# Patient Record
Sex: FEMALE | Race: WHITE | HISPANIC OR LATINO | URBAN - METROPOLITAN AREA
[De-identification: names, ages, dates, MRNs, and addresses within clinical notes are randomized per-mention and may not be internally consistent; named-entity substitution may affect disease eponyms.]

---

## 2017-08-15 PROBLEM — Z00.00 ENCOUNTER FOR PREVENTIVE HEALTH EXAMINATION: Status: ACTIVE | Noted: 2017-08-15

## 2017-10-17 ENCOUNTER — EMERGENCY (EMERGENCY)
Facility: HOSPITAL | Age: 72
LOS: 1 days | Discharge: HOME | End: 2017-10-17
Attending: EMERGENCY MEDICINE
Payer: MEDICARE

## 2017-10-17 VITALS
HEIGHT: 60 IN | SYSTOLIC BLOOD PRESSURE: 130 MMHG | WEIGHT: 225.09 LBS | OXYGEN SATURATION: 98 % | DIASTOLIC BLOOD PRESSURE: 80 MMHG | TEMPERATURE: 98 F | HEART RATE: 80 BPM | RESPIRATION RATE: 20 BRPM

## 2017-10-17 PROCEDURE — 73700 CT LOWER EXTREMITY W/O DYE: CPT

## 2017-10-17 PROCEDURE — 73700 CT LOWER EXTREMITY W/O DYE: CPT | Mod: 26,RT

## 2017-10-17 PROCEDURE — 93971 EXTREMITY STUDY: CPT

## 2017-10-17 PROCEDURE — 93971 EXTREMITY STUDY: CPT | Mod: 26,RT

## 2017-10-17 PROCEDURE — 96372 THER/PROPH/DIAG INJ SC/IM: CPT

## 2017-10-17 PROCEDURE — 99284 EMERGENCY DEPT VISIT MOD MDM: CPT

## 2017-10-17 PROCEDURE — 99284 EMERGENCY DEPT VISIT MOD MDM: CPT | Mod: 25

## 2017-10-17 RX ORDER — KETOROLAC TROMETHAMINE 30 MG/ML
60 SYRINGE (ML) INJECTION ONCE
Qty: 0 | Refills: 0 | Status: DISCONTINUED | OUTPATIENT
Start: 2017-10-17 | End: 2017-10-17

## 2017-10-17 RX ORDER — CYCLOBENZAPRINE HYDROCHLORIDE 10 MG/1
10 TABLET, FILM COATED ORAL ONCE
Qty: 0 | Refills: 0 | Status: COMPLETED | OUTPATIENT
Start: 2017-10-17 | End: 2017-10-17

## 2017-10-17 RX ADMIN — Medication 60 MILLIGRAM(S): at 12:08

## 2017-10-17 RX ADMIN — Medication 60 MILLIGRAM(S): at 13:22

## 2017-10-17 RX ADMIN — CYCLOBENZAPRINE HYDROCHLORIDE 10 MILLIGRAM(S): 10 TABLET, FILM COATED ORAL at 12:08

## 2017-10-17 NOTE — ED ADULT NURSE NOTE - OBJECTIVE STATEMENT
pt arrived wit right  knee swelling x 3 weeks pt with pain had a xray done at urgent care and came to [place] to have xray done, pt states pain 5/10 denies numbness, pt denies tingling

## 2017-10-17 NOTE — ED STATDOCS - OBJECTIVE STATEMENT
73 y/o F presents to ED c/o R knee pain xfew weeks. Pt states her sx began as a burning sensation to the R knee and has gradually worsened to pain. She notes that she fell last week which may have exacerbated the pain. Pt reports visiting the urgent care last week where she had an XR performed (unremarkable) and was dx with arthritis. She states she has been having difficulty ambulating and completely daily tasks. Pt reports taking Aleve (last dose yesterday) and Tumeric to no relief. Denies anti-coagulant use, bleeding problems, ankle pain, numbness, tingling, weakness or any other complaints at this time. NKDA.

## 2017-10-17 NOTE — ED STATDOCS - PROGRESS NOTE DETAILS
patient re-evaluated c/o left knee pain x 3 weeks, she reports h/o arthritis, and is requesting MRI.  Patient had US, pending CT.  Will re-eval.  Advised to rest, ice, elevate, and will RX Walker US reviewed, pending CT results

## 2017-10-17 NOTE — ED STATDOCS - MEDICAL DECISION MAKING DETAILS
Persistent knee pain with inability to walk? XR negative at [facility] x7 days ago. CT to eval for a cult fx, US for DVT. Treat symptomatically, check and re-assess.

## 2017-10-17 NOTE — ED ADULT TRIAGE NOTE - CHIEF COMPLAINT QUOTE
rt knee pain and swelling x 3 weeks, saw an urgent care and they did x-ray. pt arrives here asking for MRI.

## 2017-10-17 NOTE — ED STATDOCS - ATTENDING CONTRIBUTION TO CARE
I, Terrance Robles, performed the initial face to face bedside interview with this patient regarding history of present illness, review of symptoms and relevant past medical, social and family history.  I completed an independent physical examination.  I was the initial provider who evaluated this patient. I have signed out the follow up of any pending tests (i.e. labs, radiological studies) to the ACP.  I have communicated the patient’s plan of care and disposition with the ACP.

## 2017-10-23 ENCOUNTER — APPOINTMENT (OUTPATIENT)
Dept: ORTHOPEDIC SURGERY | Facility: CLINIC | Age: 72
End: 2017-10-23
Payer: MEDICARE

## 2017-10-23 ENCOUNTER — APPOINTMENT (OUTPATIENT)
Dept: DERMATOLOGY | Facility: CLINIC | Age: 72
End: 2017-10-23
Payer: MEDICARE

## 2017-10-23 VITALS
DIASTOLIC BLOOD PRESSURE: 79 MMHG | WEIGHT: 225 LBS | BODY MASS INDEX: 44.17 KG/M2 | SYSTOLIC BLOOD PRESSURE: 146 MMHG | HEIGHT: 60 IN

## 2017-10-23 DIAGNOSIS — Z82.49 FAMILY HISTORY OF ISCHEMIC HEART DISEASE AND OTHER DISEASES OF THE CIRCULATORY SYSTEM: ICD-10-CM

## 2017-10-23 DIAGNOSIS — Z78.9 OTHER SPECIFIED HEALTH STATUS: ICD-10-CM

## 2017-10-23 DIAGNOSIS — Z87.891 PERSONAL HISTORY OF NICOTINE DEPENDENCE: ICD-10-CM

## 2017-10-23 DIAGNOSIS — M25.561 PAIN IN RIGHT KNEE: ICD-10-CM

## 2017-10-23 DIAGNOSIS — Z87.39 PERSONAL HISTORY OF OTHER DISEASES OF THE MUSCULOSKELETAL SYSTEM AND CONNECTIVE TISSUE: ICD-10-CM

## 2017-10-23 DIAGNOSIS — Z56.0 UNEMPLOYMENT, UNSPECIFIED: ICD-10-CM

## 2017-10-23 DIAGNOSIS — M17.11 UNILATERAL PRIMARY OSTEOARTHRITIS, RIGHT KNEE: ICD-10-CM

## 2017-10-23 PROCEDURE — 99203 OFFICE O/P NEW LOW 30 MIN: CPT

## 2017-10-23 PROCEDURE — 99202 OFFICE O/P NEW SF 15 MIN: CPT

## 2017-10-23 RX ORDER — POLYMYXIN B SULFATE AND TRIMETHOPRIM 10000; 1 [USP'U]/ML; MG/ML
10000-0.1 SOLUTION OPHTHALMIC
Qty: 10 | Refills: 0 | Status: ACTIVE | COMMUNITY
Start: 2017-07-14

## 2017-10-23 SDOH — ECONOMIC STABILITY - INCOME SECURITY: UNEMPLOYMENT, UNSPECIFIED: Z56.0

## 2021-04-18 ENCOUNTER — EMERGENCY (EMERGENCY)
Facility: HOSPITAL | Age: 76
LOS: 1 days | Discharge: HOME | End: 2021-04-18
Attending: EMERGENCY MEDICINE
Payer: MEDICARE

## 2021-04-18 VITALS
SYSTOLIC BLOOD PRESSURE: 129 MMHG | HEART RATE: 78 BPM | TEMPERATURE: 98 F | RESPIRATION RATE: 17 BRPM | DIASTOLIC BLOOD PRESSURE: 81 MMHG | HEIGHT: 60 IN | OXYGEN SATURATION: 96 %

## 2021-04-18 PROCEDURE — 99284 EMERGENCY DEPT VISIT MOD MDM: CPT

## 2021-04-18 PROCEDURE — 99284 EMERGENCY DEPT VISIT MOD MDM: CPT | Mod: CS

## 2021-04-18 NOTE — ED ADULT TRIAGE NOTE - CHIEF COMPLAINT QUOTE
PT presents to ED s/p mechanical fall. States she missed the last step. Pain to b/l ankles. Swelling noted no deformity able to move feet and wiggle toes. Denies hitting head Denies LOC denies blood thinners

## 2021-04-19 VITALS
DIASTOLIC BLOOD PRESSURE: 80 MMHG | HEART RATE: 72 BPM | OXYGEN SATURATION: 98 % | RESPIRATION RATE: 18 BRPM | SYSTOLIC BLOOD PRESSURE: 128 MMHG

## 2021-04-19 PROBLEM — M19.90 UNSPECIFIED OSTEOARTHRITIS, UNSPECIFIED SITE: Chronic | Status: ACTIVE | Noted: 2017-10-17

## 2021-04-19 LAB
ANION GAP SERPL CALC-SCNC: 10 MMOL/L — SIGNIFICANT CHANGE UP (ref 5–17)
APTT BLD: 30.8 SEC — SIGNIFICANT CHANGE UP (ref 27.5–35.5)
BLD GP AB SCN SERPL QL: SIGNIFICANT CHANGE UP
BUN SERPL-MCNC: 20 MG/DL — SIGNIFICANT CHANGE UP (ref 8–20)
CALCIUM SERPL-MCNC: 9.4 MG/DL — SIGNIFICANT CHANGE UP (ref 8.6–10.2)
CHLORIDE SERPL-SCNC: 102 MMOL/L — SIGNIFICANT CHANGE UP (ref 98–107)
CO2 SERPL-SCNC: 26 MMOL/L — SIGNIFICANT CHANGE UP (ref 22–29)
CREAT SERPL-MCNC: 0.79 MG/DL — SIGNIFICANT CHANGE UP (ref 0.5–1.3)
GLUCOSE SERPL-MCNC: 116 MG/DL — HIGH (ref 70–99)
HCT VFR BLD CALC: 42 % — SIGNIFICANT CHANGE UP (ref 34.5–45)
HGB BLD-MCNC: 13.9 G/DL — SIGNIFICANT CHANGE UP (ref 11.5–15.5)
INR BLD: 1.13 RATIO — SIGNIFICANT CHANGE UP (ref 0.88–1.16)
MCHC RBC-ENTMCNC: 30.2 PG — SIGNIFICANT CHANGE UP (ref 27–34)
MCHC RBC-ENTMCNC: 33.1 GM/DL — SIGNIFICANT CHANGE UP (ref 32–36)
MCV RBC AUTO: 91.3 FL — SIGNIFICANT CHANGE UP (ref 80–100)
PLATELET # BLD AUTO: 265 K/UL — SIGNIFICANT CHANGE UP (ref 150–400)
POTASSIUM SERPL-MCNC: 4.2 MMOL/L — SIGNIFICANT CHANGE UP (ref 3.5–5.3)
POTASSIUM SERPL-SCNC: 4.2 MMOL/L — SIGNIFICANT CHANGE UP (ref 3.5–5.3)
PROTHROM AB SERPL-ACNC: 13 SEC — SIGNIFICANT CHANGE UP (ref 10.6–13.6)
RBC # BLD: 4.6 M/UL — SIGNIFICANT CHANGE UP (ref 3.8–5.2)
RBC # FLD: 12.2 % — SIGNIFICANT CHANGE UP (ref 10.3–14.5)
SARS-COV-2 RNA SPEC QL NAA+PROBE: SIGNIFICANT CHANGE UP
SODIUM SERPL-SCNC: 138 MMOL/L — SIGNIFICANT CHANGE UP (ref 135–145)
WBC # BLD: 14.54 K/UL — HIGH (ref 3.8–10.5)
WBC # FLD AUTO: 14.54 K/UL — HIGH (ref 3.8–10.5)

## 2021-04-19 PROCEDURE — 99284 EMERGENCY DEPT VISIT MOD MDM: CPT | Mod: 25

## 2021-04-19 PROCEDURE — U0003: CPT

## 2021-04-19 PROCEDURE — G0378: CPT

## 2021-04-19 PROCEDURE — 73610 X-RAY EXAM OF ANKLE: CPT | Mod: 26,LT

## 2021-04-19 PROCEDURE — 85027 COMPLETE CBC AUTOMATED: CPT

## 2021-04-19 PROCEDURE — 93010 ELECTROCARDIOGRAM REPORT: CPT

## 2021-04-19 PROCEDURE — 86900 BLOOD TYPING SEROLOGIC ABO: CPT

## 2021-04-19 PROCEDURE — 99236 HOSP IP/OBS SAME DATE HI 85: CPT

## 2021-04-19 PROCEDURE — 85610 PROTHROMBIN TIME: CPT

## 2021-04-19 PROCEDURE — 86901 BLOOD TYPING SEROLOGIC RH(D): CPT

## 2021-04-19 PROCEDURE — 93005 ELECTROCARDIOGRAM TRACING: CPT

## 2021-04-19 PROCEDURE — 86850 RBC ANTIBODY SCREEN: CPT

## 2021-04-19 PROCEDURE — 36415 COLL VENOUS BLD VENIPUNCTURE: CPT

## 2021-04-19 PROCEDURE — 73610 X-RAY EXAM OF ANKLE: CPT | Mod: 26,RT,77

## 2021-04-19 PROCEDURE — 80048 BASIC METABOLIC PNL TOTAL CA: CPT

## 2021-04-19 PROCEDURE — 73610 X-RAY EXAM OF ANKLE: CPT

## 2021-04-19 PROCEDURE — 85730 THROMBOPLASTIN TIME PARTIAL: CPT

## 2021-04-19 PROCEDURE — 36000 PLACE NEEDLE IN VEIN: CPT

## 2021-04-19 PROCEDURE — U0005: CPT

## 2021-04-19 RX ORDER — IBUPROFEN 200 MG
1 TABLET ORAL
Qty: 56 | Refills: 0
Start: 2021-04-19 | End: 2021-05-02

## 2021-04-19 RX ORDER — IBUPROFEN 200 MG
600 TABLET ORAL ONCE
Refills: 0 | Status: COMPLETED | OUTPATIENT
Start: 2021-04-19 | End: 2021-04-19

## 2021-04-19 RX ORDER — OXYCODONE HYDROCHLORIDE 5 MG/1
2.5 TABLET ORAL EVERY 6 HOURS
Refills: 0 | Status: DISCONTINUED | OUTPATIENT
Start: 2021-04-19 | End: 2021-04-19

## 2021-04-19 RX ORDER — ACETAMINOPHEN 500 MG
650 TABLET ORAL ONCE
Refills: 0 | Status: COMPLETED | OUTPATIENT
Start: 2021-04-19 | End: 2021-04-19

## 2021-04-19 RX ORDER — OXYCODONE HYDROCHLORIDE 5 MG/1
2.5 TABLET ORAL ONCE
Refills: 0 | Status: DISCONTINUED | OUTPATIENT
Start: 2021-04-19 | End: 2021-04-19

## 2021-04-19 RX ORDER — ACETAMINOPHEN 500 MG
2 TABLET ORAL
Qty: 112 | Refills: 0
Start: 2021-04-19 | End: 2021-05-02

## 2021-04-19 RX ORDER — ACETAMINOPHEN 500 MG
650 TABLET ORAL EVERY 6 HOURS
Refills: 0 | Status: DISCONTINUED | OUTPATIENT
Start: 2021-04-19 | End: 2021-04-23

## 2021-04-19 RX ORDER — OXYCODONE HYDROCHLORIDE 5 MG/1
1 TABLET ORAL
Qty: 4 | Refills: 0
Start: 2021-04-19 | End: 2021-04-22

## 2021-04-19 RX ADMIN — OXYCODONE HYDROCHLORIDE 2.5 MILLIGRAM(S): 5 TABLET ORAL at 01:53

## 2021-04-19 RX ADMIN — Medication 650 MILLIGRAM(S): at 00:25

## 2021-04-19 RX ADMIN — Medication 650 MILLIGRAM(S): at 07:29

## 2021-04-19 RX ADMIN — Medication 600 MILLIGRAM(S): at 00:25

## 2021-04-19 RX ADMIN — Medication 600 MILLIGRAM(S): at 07:29

## 2021-04-19 RX ADMIN — OXYCODONE HYDROCHLORIDE 2.5 MILLIGRAM(S): 5 TABLET ORAL at 07:29

## 2021-04-19 NOTE — ED PROVIDER NOTE - CLINICAL SUMMARY MEDICAL DECISION MAKING FREE TEXT BOX
76 y/o F with PMHx arthritis presents to ED BIBEMS c/o bilateral ankle pain s/p mechanical fall today. Pt was walking down stairs when she missed a step and fell down 1 stair onto grass. C/o pain to both ankles worsened with weight bearing.  -Will give analgesics for pain, check xray and reassess

## 2021-04-19 NOTE — ED CDU PROVIDER INITIAL DAY NOTE - ATTENDING CONTRIBUTION TO CARE
76 yo female with bilateral distal fib fractures pending PT consultation and ortho consultation to determine if she can be safely discharged due to fall risk. I personally saw the patient with the PA, and completed the key components of the history and physical exam. I then discussed the management plan with the PA.

## 2021-04-19 NOTE — CONSULT NOTE ADULT - SUBJECTIVE AND OBJECTIVE BOX
Pt Name: AARON CAREY    MRN: [id number]      Patient is a 75y Female presenting to the emergency department with a chief complaint of Patient of b/l ankle pain s/p fall today. Pt states that she missed the last stair and twisted both of her ankles. Pt denies numbness/tingling and has no other complaints at this time.      REVIEW OF SYSTEMS    General: Alert, responsive, in NAD    Skin/Breast: No rashes, no pruritis, + b/ ankle swelling/ecchymosis   	  Ophthalmologic: No visual changes. No redness.   	  ENMT:	No discharge. No swelling.    Respiratory and Thorax: No difficulty breathing. No cough.  	   Cardiovascular:	No chest pain. No palpitations.    Gastrointestinal:	 No abdominal pain. No diarrhea.     Genitourinary: No dysuria. No bleeding.    Musculoskeletal: SEE HPI.    Neurological: No sensory or motor changes.     Psychiatric: No anxiety or depression.    Hematology/Lymphatics: No swelling.    Endocrine: No Hx of diabetes.    ROS is otherwise negative.    PAST MEDICAL & SURGICAL HISTORY:  PAST MEDICAL & SURGICAL HISTORY:  Arthritis    No significant past surgical history        Allergies: No Known Allergies      Medications:     FAMILY HISTORY:  : non-contributory    Social History: Denies ETOH abuse.    Ambulation: Walking independently [ x ] With Cane [ ] With Walker [ ]  Bedbound [ ]                 Vital Signs Last 24 Hrs  T(C): 36.9 (18 Apr 2021 23:57), Max: 36.9 (18 Apr 2021 23:57)  T(F): 98.5 (18 Apr 2021 23:57), Max: 98.5 (18 Apr 2021 23:57)  HR: 78 (18 Apr 2021 23:57) (78 - 78)  BP: 129/81 (18 Apr 2021 23:57) (129/81 - 129/81)  BP(mean): --  RR: 17 (18 Apr 2021 23:57) (17 - 17)  SpO2: 96% (18 Apr 2021 23:57) (96% - 96%)    Daily Height in cm: 152.4 (18 Apr 2021 23:57)    Daily       PHYSICAL EXAM:      Appearance: Alert, responsive, in no acute distress.    Neurological: Sensation is grossly intact to light touch. 5/5 motor function of all extremities. No focal deficits or weaknesses found.    Skin: no rash on visible skin. Skin is clean, dry and intact. No bleeding. No abrasions. No ulcerations.    Vascular: 2+ distal pulses. Cap refill < 2 sec. No signs of venous insufficiency or stasis. No extremity ulcerations. No cyanosis.    Musculoskeletal:         Left Upper Extremity:  + NROM. Non-tender. No signs of trauma.        Right Upper Extremity:  + NROM. Non-tender. No signs of trauma.        Left Lower Extremity: +mild swelling/ecchymosis of the lateral ankle with TTP of the lateral malleolus, +plantar/dorsiflexion/EHL/FHL intact, SILT, 2+ DP pulse palpated, compartments soft and compressible, no open wounds or active bleeding noted.       Right Lower Extremity: +mild swelling/ecchymosis of the lateral ankle with TTP of the lateral malleolus, +plantar/dorsiflexion/EHL/FHL intact, SILT, 2+ DP pulse palpated, compartments soft and compressible, no open wounds or active bleeding noted.    Imaging Studies: All imaging reviewed with Dr. Coleman.    Procedure: SPLINTING   PROCEDURE NOTE: Splinting    Performed by: Guru Guerrero PA-C     Indication: Right lateral malleolus fx & left distal fibula fx    The LLE/RLE was appropriately positioned. A well padded plaster splint was applied. Distally, the extremity was neurovascular intact following the procedure. The patient tolerated the procedure well.     A/P:  Pt is a  75y Female with  a right nondisplaced lateral malleolus fx & Left nondisplaced distal fibula fx s/p fall today.    PLAN d/w Dr. Coleman:   * No immediate orthopedic surgical intervention necessary at this time  * b/l posterior splints applied  * Pt agrees to stay until the morning to receive b/l CAM boots ( to be ordered with [facility] for the a.m.)  * WBAT of the b/l LEs in CAM boot  * Recommend PT eval in the a.m. ( Patient states that she has no interest in going to rehab and her plan is to go home after receiving CAM boots)  * Follow up with Dr. Coleman in the office within 1 week  * Ortho stable

## 2021-04-19 NOTE — CHART NOTE - NSCHARTNOTEFT_GEN_A_CORE
SOCIAL WORK NOTE:  SW CONSULT ORDERED IN [facility] FOR [facility].  PATIENT WITH BILATERAL FRACTURES. AWAITING CAM BOOTS AND PT EVALUATION.   SPOKE WITH PATIENT WHOM IS INSISTING ON GOING HOME NO MATTER WHAT.  CASE PASSED TO [facility] FOR HOME ASSESSMENT.

## 2021-04-19 NOTE — PROVIDER CONTACT NOTE (OTHER) - ASSESSMENT
PT ordered. chart reviewed and noted. pt received in ED, semi figueroa position in stretcher, agreeable to PT. pt requires assistance for functional mobility due to strength and balance deficits. melchor ankle pain pre/post session: 4/10. pt left as received will continue to follow, NAD, all lines intact,   goals: 3-5x a week for 4 days   gait: 50 feet RW modified I   stairs: 5 steps 1 rail supervision

## 2021-04-19 NOTE — ED PROVIDER NOTE - OBJECTIVE STATEMENT
76 y/o F with PMHx arthritis presents to ED BIBEMS c/o bilateral ankle pain s/p mechanical fall today. Pt was walking down stairs when she missed a step and fell down 1 stair onto grass. C/o pain to both ankles worsened with weight bearing. Denies any other acute complaint, head trauma, LOC, hip pain, vomiting, syncope, dizziness, visual changes, CP. Denies preceding sxs to fall. Denies use of anticoagulants. Took no analgesics prior to arrival. 76 y/o F with PMHx arthritis presents to ED BIBEMS c/o bilateral ankle pain s/p mechanical fall today. Pt was walking down stairs when she missed a step and fell down 1 stair onto grass. C/o pain to both ankles worsened with weight bearing. Pt walked back into house to call ambulance. Denies any other acute complaint, head trauma, LOC, hip pain, vomiting, syncope, dizziness, visual changes, CP. Denies preceding sxs to fall. Denies use of anticoagulants. Took no analgesics prior to arrival.

## 2021-04-19 NOTE — PHYSICAL THERAPY INITIAL EVALUATION ADULT - PERTINENT HX OF CURRENT PROBLEM, REHAB EVAL
s/p fall, s/p right non displaced lateral malleolus fracture, left nondisplaced distal fibular fracture

## 2021-04-19 NOTE — ED CDU PROVIDER DISPOSITION NOTE - PRINCIPAL DIAGNOSIS
Closed fracture of distal end of fibula, unspecified fracture morphology, unspecified laterality, initial encounter

## 2021-04-19 NOTE — ED ADULT NURSE REASSESSMENT NOTE - NS ED NURSE REASSESS COMMENT FT1
Assuming care from previous RN, pt A&O x's 4, resp even and unlabored, color good, pt denies pain at this time and offers no complaints, awaiting PT and SW consult, will continue to monitor for safety and comfort, call bell within reach

## 2021-04-19 NOTE — ED CDU PROVIDER DISPOSITION NOTE - CLINICAL COURSE
Patient is a 75 year old female who presented to ER s/p fall.  patient was placed in obs for PT and orthopedic consult.  patient had cam boots applied by orthotic team.  patient was seen by PT And cleared for discharge home.  patient will be given a walker and discharged

## 2021-04-19 NOTE — ED PROVIDER NOTE - PROGRESS NOTE DETAILS
CHATA [place] NOTE: Pt with both fibula fx, seen by ortho, placed in splint. Will place in observation for PT/ SW in morning. Pt agreeable to plan

## 2021-04-19 NOTE — ED CDU PROVIDER DISPOSITION NOTE - PATIENT PORTAL LINK FT
You can access the FollowMyHealth Patient Portal offered by [facility] by registering at the following website: [email or web address]. By joining [facility]’s FollowMyHealth portal, you will also be able to view your health information using other applications (apps) compatible with our system.

## 2021-04-19 NOTE — ED CDU PROVIDER INITIAL DAY NOTE - MEDICAL DECISION MAKING DETAILS
76 y/o F with PMHx arthritis presents to ED BIBEMS c/o bilateral ankle pain s/p mechanical fall today. Pt with bilateral fibula fractures, seen and splinted by orthopedics. Placed in observation for PT eval, SW and likely CAM boot placement

## 2021-04-19 NOTE — ED CDU PROVIDER DISPOSITION NOTE - NSFOLLOWUPINSTRUCTIONS_ED_ALL_ED_FT
- Please follow up with your Primary Care Doctor in 24-48 hr.  - Seek immediate medical care for any new, worsening or concerning signs or symptoms.   - Take medications as directed, be sure to read all instructions on packaging  - You were given copies of all your test results, please bring to your primary care doctor for review of any abnormal test results  - Follow up with orthopedics, in 1 week    Feel better!

## 2021-04-19 NOTE — PROGRESS NOTE ADULT - SUBJECTIVE AND OBJECTIVE BOX
Cristina was awake and alert as I fit her with B/L Cam walkers ,after removing the ace wraps and posterior splints. She was instructed on donning ,air cell adjustment , and basic walking instructions. Additional socks and contact info were provided.  [facility]  [phone]

## 2021-04-19 NOTE — ED PROVIDER NOTE - ATTENDING CONTRIBUTION TO CARE
76 yo female presents for evaluation s/p trip and fall over last step earlier this evening with continued pain and difficulty walking prompting presentation. I personally saw the patient with the PA, and completed the key components of the history and physical exam. I then discussed the management plan with the PA.

## 2021-04-19 NOTE — ED PROVIDER NOTE - PHYSICAL EXAMINATION
Vital signs noted, see flowsheet.  General: NAD, well appearing and non-toxic.  HEENT: NC/AT. MMM. Conjunctiva and sclera clear b/l.  EOMI. PERRL.  Neck: Soft and supple, full ROM without pain. No midline ttp   Cardiac: RRR. +S1/S2. Peripheral pulses 2+ and symmetric b/l.   Respiratory: Speaking in full sentences, Lungs CTA b/l, no wheezes/rhonchi/rales/stridor.   Abdomen: Soft, NTND. No guarding or rebound tenderness. No abdominal bruising   Back: Spine midline and non-tender. No CVAT.  Skin: Normal color for race, no evidence of laceration/ abrasion, cyanosis or jaundice.   Neuro: Awake, alert and oriented to person/place/time/situation. M  Psych: Normal affect     LLE: FROM hip, knee. Ankle limited ROM secondary to pain. Lateral malleolus tenderness with overlying swelling. Cap refill intact.   RLE: FROM hip, knee. Ankle limited ROM secondary to pain. Lateral malleolus tenderness with overlying swelling. Cap refill intact.

## 2021-04-19 NOTE — ED CDU PROVIDER INITIAL DAY NOTE - OBJECTIVE STATEMENT
76 y/o F with PMHx arthritis presents to ED BIBEMS c/o bilateral ankle pain s/p mechanical fall today. Pt was walking down stairs when she missed a step and fell down 1 stair onto grass. C/o pain to both ankles worsened with weight bearing. Pt walked back into house to call ambulance. Denies any other acute complaint, head trauma, LOC, hip pain, vomiting, syncope, dizziness, visual changes, CP. Denies preceding sxs to fall. Denies use of anticoagulants. Took no analgesics prior to arrival.

## 2021-04-19 NOTE — ED CDU PROVIDER DISPOSITION NOTE - CARE PROVIDER_API CALL
Lilliam Coleman)  Orthopedics  [address] [place] [address], [address]  [place] [address]  Phone: [phone]  Fax: [phone]  Follow Up Time:

## 2021-04-19 NOTE — ED ADULT NURSE NOTE - OBJECTIVE STATEMENT
Assumed patient care at this time, Patient is alert and oriented x3, patient was s/p fall down steps c/o melchor ankle pain. swelling noted to bilateral extremities earlier. patient splinted to melchor lower extremities by ortho. +csm. denies numbness or tingling to any or all extremities at this time. denies head  strike with fall.

## 2021-04-27 ENCOUNTER — APPOINTMENT (OUTPATIENT)
Dept: ORTHOPEDIC SURGERY | Facility: CLINIC | Age: 76
End: 2021-04-27
Payer: COMMERCIAL

## 2021-04-27 VITALS
HEIGHT: 60 IN | SYSTOLIC BLOOD PRESSURE: 159 MMHG | HEART RATE: 68 BPM | DIASTOLIC BLOOD PRESSURE: 81 MMHG | BODY MASS INDEX: 41.23 KG/M2 | WEIGHT: 210 LBS

## 2021-04-27 VITALS — TEMPERATURE: 96.7 F

## 2021-04-27 DIAGNOSIS — S82.892A OTHER FRACTURE OF RIGHT LOWER LEG, INITIAL ENCOUNTER FOR CLOSED FRACTURE: ICD-10-CM

## 2021-04-27 DIAGNOSIS — S82.891A OTHER FRACTURE OF RIGHT LOWER LEG, INITIAL ENCOUNTER FOR CLOSED FRACTURE: ICD-10-CM

## 2021-04-27 DIAGNOSIS — S82.402A UNSPECIFIED FRACTURE OF SHAFT OF RIGHT FIBULA, INITIAL ENCOUNTER FOR CLOSED FRACTURE: ICD-10-CM

## 2021-04-27 DIAGNOSIS — S82.401A UNSPECIFIED FRACTURE OF SHAFT OF RIGHT FIBULA, INITIAL ENCOUNTER FOR CLOSED FRACTURE: ICD-10-CM

## 2021-04-27 PROCEDURE — 27786 TREATMENT OF ANKLE FRACTURE: CPT | Mod: 50

## 2021-04-27 PROCEDURE — 99203 OFFICE O/P NEW LOW 30 MIN: CPT | Mod: 57

## 2021-04-27 PROCEDURE — 99072 ADDL SUPL MATRL&STAF TM PHE: CPT

## 2021-04-27 NOTE — HISTORY OF PRESENT ILLNESS
[de-identified] : Patient is a 75-year-old female who presents today for evaluation of bilateral ankle pain.  She suffered a fall about a week ago.  She was seen in the hospital.  Bilateral distal fibula fractures were diagnosed.  She was placed in bilateral Cam boots.  She comes in today for follow-up.  She is able to ambulate with a walker and her cam boots.  She feels the cam boots are very helpful and she would be unable to walk without them.  The patient states the pain is made worse with activity and relieved with rest.  Aching, 4 out of 10. [Bending] : worsened by bending [Lifting] : worsened by lifting [Weight Bearing] : worsened by weight bearing [Recumbency] : relieved by recumbency [Rest] : relieved by rest

## 2021-04-27 NOTE — DISCUSSION/SUMMARY
[de-identified] : The patient presents today with clinical exam findings and radiographic imaging consistent with bilateral minimally displaced distal fibula fractures. Based on the fracture pattern and the ankle stability, it does not require surgical intervention. The ankle mortise appears quite stable and as such should be able to be treated nonoperatively. We will allow the patient to weight-bear as tolerated in a cam walker boot. The patient should come back in one month for repeat x-rays to evaluate for bony healing. At that time we will likely advance to an ankle brace for rotational support for one additional month. \par \par The patient was given the opportunity to ask questions and all questions were answered to their satisfaction.\par \par David Mensah MD\par Orthopaedic Trauma Surgeon\par [facility]\par [facility] [place]\par Director Orthopaedic Trauma, [facility]\par \par \par \par

## 2021-04-27 NOTE — REASON FOR VISIT
[Initial Visit] : an initial visit for [Formal Caregiver] : formal caregiver [FreeTextEntry2] : Bilateral ankle pain

## 2021-04-27 NOTE — PHYSICAL EXAM
[de-identified] : Physical Exam:\par General: Well appearing, no acute distress, A&O\par Neurologic: A&Ox3, No focal deficits\par Head: NCAT without abrasions, lacerations, or ecchymosis to head, face, or scalp\par Respiratory: Equal chest wall expansion bilaterally, no accessory muscle use\par Lymphatic: No lymphadenopathy palpated\par Skin: Warm and dry\par Psychiatric: Normal mood and affect\par \par BLE: \par SILT s/s/sp/dp/t\par Fires EHL/FHL/GS/TA\par 2+ DP/PT pulse\par brisk capillary refill\par Positive tenderness to palpation bilateral fibula [de-identified] : Plain films of the bilateral ankles were obtained in the emergency department and reviewed today.  There are nondisplaced fractures of the bilateral distal fibula.

## 2021-04-28 ENCOUNTER — FORM ENCOUNTER (OUTPATIENT)
Age: 76
End: 2021-04-28

## 2021-05-25 ENCOUNTER — APPOINTMENT (OUTPATIENT)
Dept: ORTHOPEDIC SURGERY | Facility: CLINIC | Age: 76
End: 2021-05-25

## 2021-06-08 ENCOUNTER — APPOINTMENT (OUTPATIENT)
Dept: ORTHOPEDIC SURGERY | Facility: CLINIC | Age: 76
End: 2021-06-08
Payer: MEDICARE

## 2021-06-08 VITALS
BODY MASS INDEX: 41.23 KG/M2 | SYSTOLIC BLOOD PRESSURE: 159 MMHG | HEART RATE: 76 BPM | WEIGHT: 210 LBS | DIASTOLIC BLOOD PRESSURE: 90 MMHG | HEIGHT: 60 IN

## 2021-06-08 PROCEDURE — 73610 X-RAY EXAM OF ANKLE: CPT | Mod: 50

## 2021-06-08 PROCEDURE — 99024 POSTOP FOLLOW-UP VISIT: CPT

## 2021-06-08 NOTE — DISCUSSION/SUMMARY
[de-identified] : The patient presents today with clinical exam findings and radiographic imaging consistent with bilateral minimally displaced distal fibula fractures. Based on the fracture pattern and the ankle stability, it does not require surgical intervention. The ankle mortise appears quite stable and as such should be able to be treated nonoperatively. We will allow the patient to weight-bear as tolerated in regular shoes at the present time.  She is allowed to start returning to all activities as tolerated.  Physical therapy was offered but she politely declined.\par \par The question of when to drive is impossible to generalize to everyone because it is largely dependent on the individual.  Importantly, doctors do not have a license with the DMV to "clear you" or "release you" to return to driving.  There are 3 primary criteria that must be met.  You need to be off of narcotic pain medicines (otherwise you are driving under the influence).  You need to be able to get in and out of the 's seat comfortably.  And you must have regained your normal reflexes / strength.  Also, return to driving depends partly on what side had surgery (ie. Right leg operates the pedals; people with Left side surgery can generally get back to driving much sooner unless you have a clutch).  The average time to return to driving is around 2 weeks after you return to normal walking for the right side and usually sooner for the left.  We recommend 'testing' yourself with another licensed  in an empty parking lot or quiet street first in order to check your reflexes moving your foot from pedal to pedal.\par \par No further orthopedic trauma intervention is required but we will help facilitate future care as needed. The patient may follow up as needed.\par \par The patient was given the opportunity to ask questions and all questions were answered to their satisfaction.\par \par David Mensah MD\par Orthopaedic Trauma Surgeon\par [facility]\par [facility] [place]\par Director Orthopaedic Trauma, [facility]\par \par \par \par

## 2021-06-08 NOTE — PHYSICAL EXAM
[de-identified] : Physical Exam:\par General: Well appearing, no acute distress, A&O\par Neurologic: A&Ox3, No focal deficits\par Head: NCAT without abrasions, lacerations, or ecchymosis to head, face, or scalp\par Respiratory: Equal chest wall expansion bilaterally, no accessory muscle use\par Lymphatic: No lymphadenopathy palpated\par Skin: Warm and dry\par Psychiatric: Normal mood and affect\par \par BLE: \par SILT s/s/sp/dp/t\par Fires EHL/FHL/GS/TA\par 2+ DP/PT pulse\par brisk capillary refill\par Minimal positive tenderness to palpation bilateral fibula [de-identified] : Plain films of the bilateral ankles were obtained today compared to films of the last visit.  There is continued healing of minimally displaced bilateral distal fibula fractures.

## 2021-06-08 NOTE — HISTORY OF PRESENT ILLNESS
[de-identified] : Patient is a 75-year-old female who presents today for follow-up evaluation of bilateral ankle pain.  She suffered a fall a little over a month ago.  She was seen in the hospital.  Bilateral distal fibula fractures were diagnosed.  She was placed in bilateral Cam boots.  She comes in today for follow-up.  Since her last visit she has been able to discontinue the cam boots and is walking without assistive devices.  The patient states the pain is made worse with activity and relieved with rest.  Aching, 4 out of 10.

## 2021-08-11 ENCOUNTER — APPOINTMENT (OUTPATIENT)
Dept: ORTHOPEDIC SURGERY | Facility: CLINIC | Age: 76
End: 2021-08-11

## 2021-09-27 ENCOUNTER — APPOINTMENT (OUTPATIENT)
Dept: ORTHOPEDIC SURGERY | Facility: CLINIC | Age: 76
End: 2021-09-27
Payer: MEDICARE

## 2021-09-27 VITALS
HEIGHT: 60 IN | HEART RATE: 66 BPM | BODY MASS INDEX: 41.23 KG/M2 | WEIGHT: 210 LBS | SYSTOLIC BLOOD PRESSURE: 151 MMHG | DIASTOLIC BLOOD PRESSURE: 91 MMHG

## 2021-09-27 DIAGNOSIS — M25.572 PAIN IN LEFT ANKLE AND JOINTS OF LEFT FOOT: ICD-10-CM

## 2021-09-27 DIAGNOSIS — M25.521 PAIN IN RIGHT ELBOW: ICD-10-CM

## 2021-09-27 DIAGNOSIS — M25.571 PAIN IN RIGHT ANKLE AND JOINTS OF RIGHT FOOT: ICD-10-CM

## 2021-09-27 PROCEDURE — 99214 OFFICE O/P EST MOD 30 MIN: CPT

## 2021-09-27 PROCEDURE — 73610 X-RAY EXAM OF ANKLE: CPT | Mod: 50

## 2021-09-27 PROCEDURE — 73080 X-RAY EXAM OF ELBOW: CPT | Mod: RT

## 2021-09-27 NOTE — HISTORY OF PRESENT ILLNESS
[de-identified] : Patient is a 76-year-old female who presents today for follow-up evaluation of bilateral ankle pain.  She suffered a fall.  She was seen in the hospital.  Bilateral distal fibula fractures were diagnosed.  She was placed in bilateral Cam boots in the hospital.  She comes in today for follow-up. she is walking without assistive devices.  The patient states the pain is made worse with activity and relieved with rest.  Aching, 3 out of 10. also has c/o elbow pain [Bending] : worsened by bending [Lifting] : worsened by lifting [Weight Bearing] : worsened by weight bearing [Recumbency] : relieved by recumbency [Rest] : relieved by rest

## 2021-09-27 NOTE — DISCUSSION/SUMMARY
[de-identified] : The patient presents today with clinical exam findings and radiographic imaging consistent with bilateral minimally displaced distal fibula fractures. Based on the fracture pattern and the ankle stability, it does not require surgical intervention. The ankle mortise appears quite stable and as such should be able to be treated nonoperatively. We will allow the patient to weight-bear as tolerated in regular shoes at the present time.  She is allowed to start returning to all activities as tolerated.  Physical therapy was offered but she politely declined.\par \par The question of when to drive is impossible to generalize to everyone because it is largely dependent on the individual.  Importantly, doctors do not have a license with the DMV to "clear you" or "release you" to return to driving.  There are 3 primary criteria that must be met.  You need to be off of narcotic pain medicines (otherwise you are driving under the influence).  You need to be able to get in and out of the 's seat comfortably.  And you must have regained your normal reflexes / strength.  Also, return to driving depends partly on what side had surgery (ie. Right leg operates the pedals; people with Left side surgery can generally get back to driving much sooner unless you have a clutch).  The average time to return to driving is around 2 weeks after you return to normal walking for the right side and usually sooner for the left.  We recommend 'testing' yourself with another licensed  in an empty parking lot or quiet street first in order to check your reflexes moving your foot from pedal to pedal.\par \par No further orthopedic trauma intervention is required but we will help facilitate future care as needed. The patient may follow up as needed.\par \par The patient was given the opportunity to ask questions and all questions were answered to their satisfaction.\par \par David Mensah MD\par Orthopaedic Trauma Surgeon\par [facility]\par [facility] [place]\par Director Orthopaedic Trauma, [facility]\par \par \par \par

## 2021-09-27 NOTE — PHYSICAL EXAM
[de-identified] : Physical Exam:\par General: Well appearing, no acute distress, A&O\par Neurologic: A&Ox3, No focal deficits\par Head: NCAT without abrasions, lacerations, or ecchymosis to head, face, or scalp\par Respiratory: Equal chest wall expansion bilaterally, no accessory muscle use\par Lymphatic: No lymphadenopathy palpated\par Skin: Warm and dry\par Psychiatric: Normal mood and affect\par \par BLE: \par SILT s/s/sp/dp/t\par Fires EHL/FHL/GS/TA\par 2+ DP/PT pulse\par brisk capillary refill\par Minimal positive tenderness to palpation bilateral fibula\par \par RUE\par SILT r/m/u\par Fires AIN/PIN/ulnar\par 2+ radial pulse\par brisk capillary refill\par + TTP lateral elbow  [de-identified] : Plain films of the bilateral ankles were obtained today compared to films of the last visit.  There is continued healing of minimally displaced bilateral distal fibula fractures.\par \par Right elbow films are normal

## 2021-10-06 ENCOUNTER — APPOINTMENT (OUTPATIENT)
Dept: ORTHOPEDIC SURGERY | Facility: CLINIC | Age: 76
End: 2021-10-06

## 2022-05-07 ENCOUNTER — INPATIENT (INPATIENT)
Facility: HOSPITAL | Age: 77
LOS: 3 days | Discharge: HOME | DRG: 375 | End: 2022-05-11
Attending: SURGERY | Admitting: OBSTETRICS & GYNECOLOGY
Payer: MEDICARE

## 2022-05-07 VITALS
RESPIRATION RATE: 16 BRPM | DIASTOLIC BLOOD PRESSURE: 82 MMHG | TEMPERATURE: 99 F | WEIGHT: 180.78 LBS | OXYGEN SATURATION: 97 % | HEART RATE: 124 BPM | SYSTOLIC BLOOD PRESSURE: 128 MMHG | HEIGHT: 60 IN

## 2022-05-07 LAB
ALBUMIN SERPL ELPH-MCNC: 4.2 G/DL — SIGNIFICANT CHANGE UP (ref 3.3–5.2)
ALP SERPL-CCNC: 106 U/L — SIGNIFICANT CHANGE UP (ref 40–120)
ALT FLD-CCNC: 14 U/L — SIGNIFICANT CHANGE UP
ANION GAP SERPL CALC-SCNC: 18 MMOL/L — HIGH (ref 5–17)
AST SERPL-CCNC: 19 U/L — SIGNIFICANT CHANGE UP
BASE EXCESS BLDV CALC-SCNC: 1.3 MMOL/L — SIGNIFICANT CHANGE UP (ref -2–3)
BASOPHILS # BLD AUTO: 0.07 K/UL — SIGNIFICANT CHANGE UP (ref 0–0.2)
BASOPHILS NFR BLD AUTO: 0.4 % — SIGNIFICANT CHANGE UP (ref 0–2)
BILIRUB SERPL-MCNC: 0.7 MG/DL — SIGNIFICANT CHANGE UP (ref 0.4–2)
BUN SERPL-MCNC: 17.1 MG/DL — SIGNIFICANT CHANGE UP (ref 8–20)
CALCIUM SERPL-MCNC: 10.3 MG/DL — HIGH (ref 8.6–10.2)
CHLORIDE SERPL-SCNC: 99 MMOL/L — SIGNIFICANT CHANGE UP (ref 98–107)
CO2 SERPL-SCNC: 22 MMOL/L — SIGNIFICANT CHANGE UP (ref 22–29)
CREAT SERPL-MCNC: 1.15 MG/DL — SIGNIFICANT CHANGE UP (ref 0.5–1.3)
EGFR: 50 ML/MIN/1.73M2 — LOW
EOSINOPHIL # BLD AUTO: 0.03 K/UL — SIGNIFICANT CHANGE UP (ref 0–0.5)
EOSINOPHIL NFR BLD AUTO: 0.2 % — SIGNIFICANT CHANGE UP (ref 0–6)
GLUCOSE SERPL-MCNC: 153 MG/DL — HIGH (ref 70–99)
HCO3 BLDV-SCNC: 27 MMOL/L — SIGNIFICANT CHANGE UP (ref 22–29)
HCT VFR BLD CALC: 49.9 % — HIGH (ref 34.5–45)
HGB BLD-MCNC: 16.4 G/DL — HIGH (ref 11.5–15.5)
IMM GRANULOCYTES NFR BLD AUTO: 0.5 % — SIGNIFICANT CHANGE UP (ref 0–1.5)
LIDOCAIN IGE QN: 30 U/L — SIGNIFICANT CHANGE UP (ref 22–51)
LYMPHOCYTES # BLD AUTO: 0.76 K/UL — LOW (ref 1–3.3)
LYMPHOCYTES # BLD AUTO: 3.9 % — LOW (ref 13–44)
MCHC RBC-ENTMCNC: 29.9 PG — SIGNIFICANT CHANGE UP (ref 27–34)
MCHC RBC-ENTMCNC: 32.9 GM/DL — SIGNIFICANT CHANGE UP (ref 32–36)
MCV RBC AUTO: 91.1 FL — SIGNIFICANT CHANGE UP (ref 80–100)
MONOCYTES # BLD AUTO: 0.83 K/UL — SIGNIFICANT CHANGE UP (ref 0–0.9)
MONOCYTES NFR BLD AUTO: 4.3 % — SIGNIFICANT CHANGE UP (ref 2–14)
NEUTROPHILS # BLD AUTO: 17.46 K/UL — HIGH (ref 1.8–7.4)
NEUTROPHILS NFR BLD AUTO: 90.7 % — HIGH (ref 43–77)
PCO2 BLDV: 46 MMHG — HIGH (ref 39–42)
PH BLDV: 7.37 — SIGNIFICANT CHANGE UP (ref 7.32–7.43)
PLATELET # BLD AUTO: 388 K/UL — SIGNIFICANT CHANGE UP (ref 150–400)
PO2 BLDV: 44 MMHG — SIGNIFICANT CHANGE UP (ref 25–45)
POTASSIUM SERPL-MCNC: 4.3 MMOL/L — SIGNIFICANT CHANGE UP (ref 3.5–5.3)
POTASSIUM SERPL-SCNC: 4.3 MMOL/L — SIGNIFICANT CHANGE UP (ref 3.5–5.3)
PROT SERPL-MCNC: 8.3 G/DL — SIGNIFICANT CHANGE UP (ref 6.6–8.7)
RAPID RVP RESULT: SIGNIFICANT CHANGE UP
RBC # BLD: 5.48 M/UL — HIGH (ref 3.8–5.2)
RBC # FLD: 13.1 % — SIGNIFICANT CHANGE UP (ref 10.3–14.5)
SAO2 % BLDV: 76.1 % — SIGNIFICANT CHANGE UP
SARS-COV-2 RNA SPEC QL NAA+PROBE: SIGNIFICANT CHANGE UP
SODIUM SERPL-SCNC: 139 MMOL/L — SIGNIFICANT CHANGE UP (ref 135–145)
TROPONIN T SERPL-MCNC: <0.01 NG/ML — SIGNIFICANT CHANGE UP (ref 0–0.06)
WBC # BLD: 19.25 K/UL — HIGH (ref 3.8–10.5)
WBC # FLD AUTO: 19.25 K/UL — HIGH (ref 3.8–10.5)

## 2022-05-07 PROCEDURE — 99284 EMERGENCY DEPT VISIT MOD MDM: CPT | Mod: FS

## 2022-05-07 PROCEDURE — 71045 X-RAY EXAM CHEST 1 VIEW: CPT | Mod: 26

## 2022-05-07 PROCEDURE — 74176 CT ABD & PELVIS W/O CONTRAST: CPT | Mod: 26,MA

## 2022-05-07 RX ORDER — DIATRIZOATE MEGLUMINE 180 MG/ML
30 INJECTION, SOLUTION INTRAVESICAL ONCE
Refills: 0 | Status: COMPLETED | OUTPATIENT
Start: 2022-05-07 | End: 2022-05-07

## 2022-05-07 RX ORDER — ONDANSETRON 8 MG/1
4 TABLET, FILM COATED ORAL ONCE
Refills: 0 | Status: COMPLETED | OUTPATIENT
Start: 2022-05-07 | End: 2022-05-07

## 2022-05-07 RX ORDER — MORPHINE SULFATE 50 MG/1
2 CAPSULE, EXTENDED RELEASE ORAL ONCE
Refills: 0 | Status: DISCONTINUED | OUTPATIENT
Start: 2022-05-07 | End: 2022-05-07

## 2022-05-07 RX ORDER — IOHEXOL 300 MG/ML
30 INJECTION, SOLUTION INTRAVENOUS ONCE
Refills: 0 | Status: DISCONTINUED | OUTPATIENT
Start: 2022-05-07 | End: 2022-05-07

## 2022-05-07 RX ADMIN — DIATRIZOATE MEGLUMINE 30 MILLILITER(S): 180 INJECTION, SOLUTION INTRAVESICAL at 20:05

## 2022-05-07 RX ADMIN — ONDANSETRON 4 MILLIGRAM(S): 8 TABLET, FILM COATED ORAL at 19:08

## 2022-05-07 RX ADMIN — MORPHINE SULFATE 2 MILLIGRAM(S): 50 CAPSULE, EXTENDED RELEASE ORAL at 19:08

## 2022-05-07 NOTE — ED STATDOCS - OBJECTIVE STATEMENT
75 y/o female with no PMHx, c/o abdominal pain. Pt reports abdominal pain since AM with nausea, vomiting 1x, and SOB. Denies diarrhea. Pt is covid vaccinated. Noticed increase bloating. Denies hx of abdominal surgeries.

## 2022-05-07 NOTE — ED STATDOCS - PROGRESS NOTE DETAILS
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 results d/w pt. pt resting comfortably, NAD at this time. OBGYN resident consulted, will see pt in ED CHATA Walker: plan d/w OBGYN resident, pt to be admitted for further inpt management

## 2022-05-07 NOTE — ED ADULT NURSE REASSESSMENT NOTE - NS ED NURSE REASSESS COMMENT FT1
assumed care of pt at 19:25. report received from viviane cedeno. charting as noted. rr even and unlabored. anox4. pt educated on plan of care, pt able to successfully teach back plan of care to RN, RN will continue to reeducate pt during hospital stay. awaiting ct

## 2022-05-07 NOTE — ED STATDOCS - NS_ ATTENDINGSCRIBEDETAILS _ED_A_ED_FT
I, Freddie Gudino, performed the initial face to face bedside interview with this patient regarding history of present illness, review of symptoms and relevant past medical, social and family history.  I completed an independent physical examination.  I was the initial provider who evaluated this patient. I have signed out the follow up of any pending tests (i.e. labs, radiological studies) to the ACP.  I have communicated the patient’s plan of care and disposition with the ACP.  The history, relevant review of systems, past medical and surgical history, medical decision making, and physical examination was documented by the scribe in my presence and I attest to the accuracy of the documentation.

## 2022-05-08 DIAGNOSIS — R18.8 OTHER ASCITES: ICD-10-CM

## 2022-05-08 LAB
CANCER AG125 SERPL-ACNC: 22 U/ML — SIGNIFICANT CHANGE UP
CANCER AG19-9 SERPL-ACNC: 9 U/ML — SIGNIFICANT CHANGE UP
CEA SERPL-MCNC: 1.2 NG/ML — SIGNIFICANT CHANGE UP (ref 0–3.8)

## 2022-05-08 PROCEDURE — 74177 CT ABD & PELVIS W/CONTRAST: CPT | Mod: 26

## 2022-05-08 PROCEDURE — 99223 1ST HOSP IP/OBS HIGH 75: CPT

## 2022-05-08 RX ORDER — FAMOTIDINE 10 MG/ML
20 INJECTION INTRAVENOUS
Refills: 0 | Status: DISCONTINUED | OUTPATIENT
Start: 2022-05-08 | End: 2022-05-11

## 2022-05-08 RX ORDER — FAMOTIDINE 10 MG/ML
20 INJECTION INTRAVENOUS ONCE
Refills: 0 | Status: COMPLETED | OUTPATIENT
Start: 2022-05-08 | End: 2022-05-08

## 2022-05-08 RX ADMIN — FAMOTIDINE 20 MILLIGRAM(S): 10 INJECTION INTRAVENOUS at 08:27

## 2022-05-08 RX ADMIN — FAMOTIDINE 20 MILLIGRAM(S): 10 INJECTION INTRAVENOUS at 18:00

## 2022-05-08 NOTE — H&P ADULT - NSHPLABSRESULTS_GEN_ALL_CORE
Complete Blood Count + Automated Diff (05.07.22 @ 18:55)    WBC Count: 19.25 K/uL    RBC Count: 5.48 M/uL    Hemoglobin: 16.4 g/dL    Hematocrit: 49.9 %    Mean Cell Volume: 91.1 fl    Mean Cell Hemoglobin: 29.9 pg    Mean Cell Hemoglobin Conc: 32.9 gm/dL    Red Cell Distrib Width: 13.1 %    Platelet Count - Automated: 388 K/uL    Auto Neutrophil #: 17.46 K/uL    Auto Lymphocyte #: 0.76 K/uL    Auto Monocyte #: 0.83 K/uL    Auto Eosinophil #: 0.03 K/uL    Auto Basophil #: 0.07 K/uL    Auto Neutrophil %: 90.7: Differential percentages must be correlated with absolute numbers for  clinical significance. %    Auto Lymphocyte %: 3.9 %    Auto Monocyte %: 4.3 %    Auto Eosinophil %: 0.2 %    Auto Basophil %: 0.4 %    Auto Immature Granulocyte %: 0.5: (Includes meta, myelo and promyelocytes) %      < from: CT Abdomen and Pelvis w/ Oral Cont (05.07.22 @ 22:06) >    INTERPRETATION:  CLINICAL INFORMATION: Abdominal pain    COMPARISON: None.    CONTRAST/COMPLICATIONS:  IV Contrast: NONE  Oral Contrast: Omnipaque 300 + Fruit 2o  Complications: None reported at time of study completion    PROCEDURE:  CT of the Abdomen and Pelvis was performed.  Sagittal and coronal reformats were performed.    FINDINGS:  LOWER CHEST: Coarse left breast calcification. Asymmetric right upper   outer quadrant breast tissue. Oral contrast in the distal esophagus;   recommend aspiration precautions.    LIVER: Scalp margin of the liver compatible with serosal implants as   described below.  BILE DUCTS: Normal caliber.  GALLBLADDER: Within normal limits.  SPLEEN: Within normal limits.  PANCREAS: Within normal limits.  ADRENALS: Within normal limits.  KIDNEYS/URETERS: Within normal limits.    BLADDER: Within normal limits.  REPRODUCTIVE ORGANS: Central endometrial calcification. Bilateral   multicystic and calcified adnexal masses are seen, measuring 8.5 x 8.3 cm   on the right and 11.6 x 10.0 cm on the left.    BOWEL: No bowel obstruction. Appendix is not discretely visualized due to   multiple loculated peritoneal implants in this region. Diverticulosis   coli.  PERITONEUM: Diffuse peritoneal and serosal calcifications with moderate   volume loculated ascites. Innumerable peritoneal calcifications and   loculated peritoneal implants, many of which are partiallycalcified.  VESSELS: Within normal limits.  RETROPERITONEUM/LYMPH NODES: No lymphadenopathy.  ABDOMINAL WALL: Within normal limits.  BONES: Degenerative changes.    IMPRESSION:  Moderate volume loculated ascites with diffuse peritoneal/serosal   calcification, innumerable low-density calcified peritoneal implants, and   bilateral calcified adnexal masses. 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 Differential   however includes synchronous bilateral ovarian mucinous or serous tumors   causing the diffuse peritoneal disease. Central calcification in the   endometrium for which mucinous neoplasm of the endometrium is not   excluded.    Incidental note of asymmetric right upper outer quadrant breast tissue   for which physical exam correlation is recommended and dedicated breast   imaging as clinically warranted.    --- End of Report ---    < end of copied text >

## 2022-05-08 NOTE — H&P ADULT - NSICDXFAMILYHX_GEN_ALL_CORE_FT
FAMILY HISTORY:  Child  Still living? Unknown  Family history of obesity, Age at diagnosis: Age Unknown

## 2022-05-08 NOTE — H&P ADULT - NSHPPHYSICALEXAM_GEN_ALL_CORE
Gen: NAD, AOx3  Lungs: CTABL, no WRR  CV: S1S2, RRR  Abd: soft, nontender, non-distended, no rebounding or guarding, +BS  Ext: no calf tenderness bilaterally, evidence of venous insufficiency

## 2022-05-08 NOTE — H&P ADULT - ASSESSMENT
77yo  LMP age 52 admitted with appendiceal vs. ovarian tumors.     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 Based on current imaging, cancer seems to be at an advanced stage. Patient states she would like everything done. Will send tumor markers. May need biopsy for tissue diagnosis.   Cardiac: No cardiac issues  Pulmonary: no active disease  Neuro: No pain  GI: tolerating regular diet   : voiding spontaneously  ID: Leukocytosis likely related to tumor burden, no signs/symptoms of infection  Heme: SCDs when not ambulating, may consider Lovenox for VTE prophylaxis.   Skin: no active disease   Psych: no active problems   Code Status: full code  Dispo: admission to Gyn/Onc service for work up   77yo  LMP age 52 admitted for workup of appendiceal vs. ovarian primary tumor. ECOG 0.     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 Based on current imaging, cancer seems to be at an advanced stage. Patient states she would like everything done. Will send tumor markers. May need biopsy for tissue diagnosis.   Cardiac: No cardiac issues  Pulmonary: no active disease  Neuro: No pain  GI: tolerating regular diet   : voiding spontaneously  ID: Leukocytosis likely related to tumor burden, no signs/symptoms of infection  Heme: SCDs when not ambulating, may consider Lovenox for VTE prophylaxis.   Skin: no active disease   Psych: no active problems   Code Status: full code  Dispo: admission to Gyn/Onc service for work up

## 2022-05-08 NOTE — ED ADULT NURSE REASSESSMENT NOTE - NS ED NURSE REASSESS COMMENT FT1
Pt is A&O4, pt is being transferred to CDU Bed 12R and report given to Emiliana BONNER RN, pts current condition, medical history and reason for admission reviewed, R AC 20G IV site is clean/dry/intact, Pt is ambulatory without assistance, pt is not in any pain or distress at this time, pt is aware of the transfer, the need for the transfer and acclimated to the new unit, pt VS recorded and placed in the EMR, pt education deemed successful after teach back shows proficiency.

## 2022-05-08 NOTE — H&P ADULT - HISTORY OF PRESENT ILLNESS
75yo  LMP age 52 presenting to the ED complaining of abdominal tightness after she had chinese food yesterday. Patient states she tried to self-induce vomiting because she felt the food was stuck in her epigastric area and it resolved slightly. Otherwise no changes in bowel habits, fevers, chills, night sweats, weight loss, dysuria, or CP. Patient does endorse SOB which has gotten worse over the past few months. Not exacerbated with movement.     Patient states she follows up with a PCP and GYN but is unable to recall their names.    LPap: ~3yrs ago, normal per patient  LMammo: ~5yrs ago, normal per patient  LColonoscopy: never done    POBHx: 1970,   PGYNHx: Denies history of abnormal pap smears, fibroids or ovarian cysts.   PMHx/PSHx: denies  Meds: none  NKDA  SocHx: former smoker (used to smoke 1-2 cigarettes a day for 45 years), rarely drinks alcohol, former marijuana user. Lives at home and takes care of her daughter who is bedridden due to obesity. She does her own shopping, grooms herself, and is independent.   FamHx: denies any family history of uterine, ovarian, cervical, or colon cancer

## 2022-05-08 NOTE — ED ADULT NURSE NOTE - OBJECTIVE STATEMENT
Patient states that she ate chinese food causing her stomach to bloat with pain this happened yesterday

## 2022-05-08 NOTE — CONSULT NOTE ADULT - SUBJECTIVE AND OBJECTIVE BOX
ONCOLOGIC SURGERY CONSULT     HPI: 76y Female presented to ED with epigastric abdominal pain    76F who does not report other PMH presented on 5/7 to ED with epigastric abdominal pain and nausea. Sudden, after eating chinese food. Denies diarrhea or fevers, now improved. While in ED CT performed showing diffuse ascites with peritoneal nodularity and calcifications, could not visualize appendix and also noted bilateral adnexal enlargement. This was in a CT without contrast. Patient states no recent weight loss or has not noticed any change in abdominal girth. however endorses slow progressive shortness of breath during last couple of months. denies appetite changes, fevers or night sweats, has not had a colonoscopy or EGD ever, denies family history of cancer. Of note patient has a bedridden child, who she takes care of.     ROS: 10-system review is otherwise negative except HPI above.      PAST MEDICAL & SURGICAL HISTORY:  Arthritis    No significant past surgical history      FAMILY HISTORY:  Family history of obesity (Child)      Family history not pertinent as reviewed with the patient.    SOCIAL HISTORY:  Previous smoker (for 45 years, 1-2 cigarettes daily)    ALLERGIES: NKA No Known Allergies      HOME MEDICATIONS: ***  Home Medications:      --------------------------------------------------------------------------------------------    PHYSICAL EXAM:   General: NAD, Lying in bed comfortably  Neuro: A+Ox3  HEENT: EOMI, PERRLA, MMM  Cardio: RRR  Resp: Non labored breathing on RA  GI/Abd: Soft, distended non tender, no rebound/guarding, fluid wave positive, no periumbilical nodes  Vascular: All 4 extremities warm and well perfused.   Pelvis: stable  Musculoskeletal: All 4 extremities moving spontaneously, no limitations, no spinal tenderness.  --------------------------------------------------------------------------------------------    LABS                 16.4   19.25  )----------(  388       ( 07 May 2022 18:55 )               49.9      139    |  99     |  17.1   ----------------------------<  153        ( 07 May 2022 18:55 )  4.3     |  22.0   |  1.15     Ca    10.3       ( 07 May 2022 18:55 )    TPro  8.3    /  Alb  4.2    /  TBili  0.7    /  DBili  x      /  AST  19     /  ALT  14     /  AlkPhos  106    ( 07 May 2022 18:55 )    LIVER FUNCTIONS - ( 07 May 2022 18:55 )  Alb: 4.2 g/dL / Pro: 8.3 g/dL / ALK PHOS: 106 U/L / ALT: 14 U/L / AST: 19 U/L / GGT: x               CAPILLARY BLOOD GLUCOSE    CARDIAC MARKERS ( 07 May 2022 18:55 )  x     / <0.01 ng/mL / x     / x     / x              18:50 - VBG - pH: 7.370 | pCO2: 46    | pO2: 44    | Lactate:          --------------------------------------------------------------------------------------------  IMAGING  < from: CT Abdomen and Pelvis w/ Oral Cont (05.07.22 @ 22:06) >  ACC: [id number] EXAM:  CT ABDOMEN AND PELVIS OC                          PROCEDURE DATE:  05/07/2022          INTERPRETATION:  CLINICAL INFORMATION: Abdominal pain    COMPARISON: None.    CONTRAST/COMPLICATIONS:  IV Contrast: NONE  Oral Contrast: Omnipaque 300 + Fruit 2o  Complications: None reported at time of study completion    PROCEDURE:  CT of the Abdomen and Pelvis was performed.  Sagittal and coronal reformats were performed.    FINDINGS:  LOWER CHEST: Coarse left breast calcification. Asymmetric right upper   outer quadrant breast tissue. Oral contrast in the distal esophagus;   recommend aspiration precautions.    LIVER: Scalp margin of the liver compatible with serosal implants as   described below.  BILE DUCTS: Normal caliber.  GALLBLADDER: Within normal limits.  SPLEEN: Within normal limits.  PANCREAS: Within normal limits.  ADRENALS: Within normal limits.  KIDNEYS/URETERS: Within normal limits.    BLADDER: Within normal limits.  REPRODUCTIVE ORGANS: Central endometrial calcification. Bilateral   multicystic and calcified adnexal masses are seen, measuring 8.5 x 8.3 cm   on the right and 11.6 x 10.0 cm on the left.    BOWEL: No bowel obstruction. Appendix is not discretely visualized due to   multiple loculated peritoneal implants in this region. Diverticulosis   coli.  PERITONEUM: Diffuse peritoneal and serosal calcifications with moderate   volume loculated ascites. Innumerable peritoneal calcifications and   loculated peritoneal implants, many of which are partiallycalcified.  VESSELS: Within normal limits.  RETROPERITONEUM/LYMPH NODES: No lymphadenopathy.  ABDOMINAL WALL: Within normal limits.  BONES: Degenerative changes.    IMPRESSION:  Moderate volume loculated ascites with diffuse peritoneal/serosal   calcification, innumerable low-density calcified peritoneal implants, and   bilateral calcified adnexal masses. 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 Differential   however includes synchronous bilateral ovarian mucinous or serous tumors   causing the diffuse peritoneal disease. Central calcification in the   endometrium for which mucinous neoplasm of the endometrium is not   excluded.    Incidental note of asymmetric right upper outer quadrant breast tissue   for which physical exam correlation is recommended and dedicated breast   imaging as clinically warranted.    --- End of Report ---    < end of copied text >  ***

## 2022-05-08 NOTE — CONSULT NOTE ADULT - ASSESSMENT
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 Appendix not visualized however imaging performed without intravenous contrast. Unable to predict if previous perforation on appendix based on current imaging however still a posibility. also visualized adnexal masses with calcification, unclear if metastatic or primary, currently admitted to Gyn Onc. If appendix primary patient could be potential candidate for debulking surgery pending additional work up, however final plan pending attending discussion    PLAN:    - Admitted to Gyn -Onc  - Will discuss with surgery attending Dr. 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 Appendix not visualized however imaging performed without intravenous contrast. Unable to predict if previous perforation on appendix based on current imaging however still a posibility. also visualized adnexal masses with calcification, unclear if metastatic or primary, currently admitted to Gyn Onc. If appendix primary patient could be potential candidate for debulking surgery pending additional work up, however will need appropriate imaging first    PLAN:    - Admitted to Gyn -Onc  - f/u tumor markers  - Recommend CT a/P With IV contrast  - will follow while in house  - discussed with Dr. Garrido

## 2022-05-09 LAB
ANION GAP SERPL CALC-SCNC: 15 MMOL/L — SIGNIFICANT CHANGE UP (ref 5–17)
ANION GAP SERPL CALC-SCNC: 15 MMOL/L — SIGNIFICANT CHANGE UP (ref 5–17)
ANION GAP SERPL CALC-SCNC: 16 MMOL/L — SIGNIFICANT CHANGE UP (ref 5–17)
BLD GP AB SCN SERPL QL: SIGNIFICANT CHANGE UP
BUN SERPL-MCNC: 18.4 MG/DL — SIGNIFICANT CHANGE UP (ref 8–20)
BUN SERPL-MCNC: 20 MG/DL — SIGNIFICANT CHANGE UP (ref 8–20)
BUN SERPL-MCNC: 20.5 MG/DL — HIGH (ref 8–20)
CALCIUM SERPL-MCNC: 8.8 MG/DL — SIGNIFICANT CHANGE UP (ref 8.6–10.2)
CALCIUM SERPL-MCNC: 9 MG/DL — SIGNIFICANT CHANGE UP (ref 8.6–10.2)
CALCIUM SERPL-MCNC: 9.1 MG/DL — SIGNIFICANT CHANGE UP (ref 8.6–10.2)
CHLORIDE SERPL-SCNC: 102 MMOL/L — SIGNIFICANT CHANGE UP (ref 98–107)
CHLORIDE SERPL-SCNC: 103 MMOL/L — SIGNIFICANT CHANGE UP (ref 98–107)
CHLORIDE SERPL-SCNC: 103 MMOL/L — SIGNIFICANT CHANGE UP (ref 98–107)
CO2 SERPL-SCNC: 19 MMOL/L — LOW (ref 22–29)
CO2 SERPL-SCNC: 21 MMOL/L — LOW (ref 22–29)
CO2 SERPL-SCNC: 22 MMOL/L — SIGNIFICANT CHANGE UP (ref 22–29)
CREAT SERPL-MCNC: 0.69 MG/DL — SIGNIFICANT CHANGE UP (ref 0.5–1.3)
CREAT SERPL-MCNC: 0.8 MG/DL — SIGNIFICANT CHANGE UP (ref 0.5–1.3)
CREAT SERPL-MCNC: 0.89 MG/DL — SIGNIFICANT CHANGE UP (ref 0.5–1.3)
EGFR: 68 ML/MIN/1.73M2 — SIGNIFICANT CHANGE UP
EGFR: 77 ML/MIN/1.73M2 — SIGNIFICANT CHANGE UP
EGFR: 91 ML/MIN/1.73M2 — SIGNIFICANT CHANGE UP
GLUCOSE SERPL-MCNC: 132 MG/DL — HIGH (ref 70–99)
GLUCOSE SERPL-MCNC: 95 MG/DL — SIGNIFICANT CHANGE UP (ref 70–99)
GLUCOSE SERPL-MCNC: 95 MG/DL — SIGNIFICANT CHANGE UP (ref 70–99)
HCT VFR BLD CALC: 41.4 % — SIGNIFICANT CHANGE UP (ref 34.5–45)
HCT VFR BLD CALC: 47.1 % — HIGH (ref 34.5–45)
HCV AB S/CO SERPL IA: 0.11 S/CO — SIGNIFICANT CHANGE UP (ref 0–0.99)
HCV AB SERPL-IMP: SIGNIFICANT CHANGE UP
HGB BLD-MCNC: 13.5 G/DL — SIGNIFICANT CHANGE UP (ref 11.5–15.5)
HGB BLD-MCNC: 14.7 G/DL — SIGNIFICANT CHANGE UP (ref 11.5–15.5)
MAGNESIUM SERPL-MCNC: 2.3 MG/DL — SIGNIFICANT CHANGE UP (ref 1.6–2.6)
MCHC RBC-ENTMCNC: 30.1 PG — SIGNIFICANT CHANGE UP (ref 27–34)
MCHC RBC-ENTMCNC: 30.2 PG — SIGNIFICANT CHANGE UP (ref 27–34)
MCHC RBC-ENTMCNC: 31.2 GM/DL — LOW (ref 32–36)
MCHC RBC-ENTMCNC: 32.6 GM/DL — SIGNIFICANT CHANGE UP (ref 32–36)
MCV RBC AUTO: 92.4 FL — SIGNIFICANT CHANGE UP (ref 80–100)
MCV RBC AUTO: 96.7 FL — SIGNIFICANT CHANGE UP (ref 80–100)
PHOSPHATE SERPL-MCNC: 4 MG/DL — SIGNIFICANT CHANGE UP (ref 2.4–4.7)
PLATELET # BLD AUTO: 199 K/UL — SIGNIFICANT CHANGE UP (ref 150–400)
PLATELET # BLD AUTO: 308 K/UL — SIGNIFICANT CHANGE UP (ref 150–400)
POTASSIUM SERPL-MCNC: 3.6 MMOL/L — SIGNIFICANT CHANGE UP (ref 3.5–5.3)
POTASSIUM SERPL-MCNC: 4.3 MMOL/L — SIGNIFICANT CHANGE UP (ref 3.5–5.3)
POTASSIUM SERPL-MCNC: 5.8 MMOL/L — HIGH (ref 3.5–5.3)
POTASSIUM SERPL-SCNC: 3.6 MMOL/L — SIGNIFICANT CHANGE UP (ref 3.5–5.3)
POTASSIUM SERPL-SCNC: 4.3 MMOL/L — SIGNIFICANT CHANGE UP (ref 3.5–5.3)
POTASSIUM SERPL-SCNC: 5.8 MMOL/L — HIGH (ref 3.5–5.3)
RBC # BLD: 4.48 M/UL — SIGNIFICANT CHANGE UP (ref 3.8–5.2)
RBC # BLD: 4.87 M/UL — SIGNIFICANT CHANGE UP (ref 3.8–5.2)
RBC # FLD: 13.4 % — SIGNIFICANT CHANGE UP (ref 10.3–14.5)
RBC # FLD: 13.5 % — SIGNIFICANT CHANGE UP (ref 10.3–14.5)
SODIUM SERPL-SCNC: 137 MMOL/L — SIGNIFICANT CHANGE UP (ref 135–145)
SODIUM SERPL-SCNC: 138 MMOL/L — SIGNIFICANT CHANGE UP (ref 135–145)
SODIUM SERPL-SCNC: 141 MMOL/L — SIGNIFICANT CHANGE UP (ref 135–145)
WBC # BLD: 10.86 K/UL — HIGH (ref 3.8–10.5)
WBC # BLD: 10.89 K/UL — HIGH (ref 3.8–10.5)
WBC # FLD AUTO: 10.86 K/UL — HIGH (ref 3.8–10.5)
WBC # FLD AUTO: 10.89 K/UL — HIGH (ref 3.8–10.5)

## 2022-05-09 PROCEDURE — 70450 CT HEAD/BRAIN W/O DYE: CPT | Mod: 26

## 2022-05-09 PROCEDURE — 99232 SBSQ HOSP IP/OBS MODERATE 35: CPT

## 2022-05-09 PROCEDURE — 93010 ELECTROCARDIOGRAM REPORT: CPT

## 2022-05-09 RX ORDER — HALOPERIDOL DECANOATE 100 MG/ML
5 INJECTION INTRAMUSCULAR ONCE
Refills: 0 | Status: COMPLETED | OUTPATIENT
Start: 2022-05-09 | End: 2022-05-09

## 2022-05-09 RX ORDER — ENOXAPARIN SODIUM 100 MG/ML
40 INJECTION SUBCUTANEOUS ONCE
Refills: 0 | Status: COMPLETED | OUTPATIENT
Start: 2022-05-09 | End: 2022-05-09

## 2022-05-09 RX ORDER — HALOPERIDOL DECANOATE 100 MG/ML
2 INJECTION INTRAMUSCULAR ONCE
Refills: 0 | Status: DISCONTINUED | OUTPATIENT
Start: 2022-05-09 | End: 2022-05-09

## 2022-05-09 RX ORDER — SODIUM CHLORIDE 9 MG/ML
1000 INJECTION INTRAMUSCULAR; INTRAVENOUS; SUBCUTANEOUS
Refills: 0 | Status: DISCONTINUED | OUTPATIENT
Start: 2022-05-10 | End: 2022-05-11

## 2022-05-09 RX ADMIN — HALOPERIDOL DECANOATE 5 MILLIGRAM(S): 100 INJECTION INTRAMUSCULAR at 05:37

## 2022-05-09 RX ADMIN — ENOXAPARIN SODIUM 40 MILLIGRAM(S): 100 INJECTION SUBCUTANEOUS at 18:11

## 2022-05-09 RX ADMIN — Medication 1 MILLIGRAM(S): at 05:46

## 2022-05-09 NOTE — CHART NOTE - NSCHARTNOTEFT_GEN_A_CORE
Gyn onc team contacted due to change in patient's mental status. Patient seen and examined at bedside. Patient appearing agitated, yelling and claiming she was being held against her will. She was kicking and punching security guards. She pulled out her IV. Per nurse, patient got up and went to the bathroom. When she returned, she was aggressive and agitated. Rapid response called. Medicine at bedside recommends Haldol. As patient without IV access, 5mg IM Haldol given x 1. Patient remains agitated. New IV placed, CBC and BMP sent. Ativan 1mg IV given. CT head without contrast ordered and facilitated by ED. Patient becoming agitated again, another 1mg Ativan given IV. Patient's daughter contacted. Per daughter, patient does no drink any alcohol, take any drugs or have a history of anxiety or depression. Daughter informed of clinical situation and interventions. Will contact daughter as testing results.    Patient presentation and clinical situation discussed with Dr. Marroquin. Interventions per Dr. Marroquin

## 2022-05-09 NOTE — PROGRESS NOTE ADULT - ATTENDING COMMENTS
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 Recommend IR biopsy of of peritoneal fluid for cytology. Patient may then benefit from cytoreductive surgery.

## 2022-05-09 NOTE — CHART NOTE - NSCHARTNOTEFT_GEN_A_CORE
Patient seen and examined in CDU.  Patient resting comfortably s/p haldol + ativan.  Patient responsive to physical stimuli.  Reflexes intact.    CBC, BMP, Mag, Phos STAT ordered.  Plan pending multidisciplinary discussion Patient seen and examined in CDU with Dr. Marroquin  Patient resting comfortably s/p haldol + ativan.  Patient responsive to physical stimuli.  Reflexes intact.    CBC, BMP, Mag, Phos STAT ordered.  Plan pending multidisciplinary discussion

## 2022-05-10 ENCOUNTER — RESULT REVIEW (OUTPATIENT)
Age: 77
End: 2022-05-10

## 2022-05-10 LAB
ANION GAP SERPL CALC-SCNC: 14 MMOL/L — SIGNIFICANT CHANGE UP (ref 5–17)
APTT BLD: 32.8 SEC — SIGNIFICANT CHANGE UP (ref 27.5–35.5)
BUN SERPL-MCNC: 17.6 MG/DL — SIGNIFICANT CHANGE UP (ref 8–20)
CALCIUM SERPL-MCNC: 8.4 MG/DL — LOW (ref 8.6–10.2)
CHLORIDE SERPL-SCNC: 107 MMOL/L — SIGNIFICANT CHANGE UP (ref 98–107)
CO2 SERPL-SCNC: 22 MMOL/L — SIGNIFICANT CHANGE UP (ref 22–29)
CREAT SERPL-MCNC: 0.74 MG/DL — SIGNIFICANT CHANGE UP (ref 0.5–1.3)
EGFR: 85 ML/MIN/1.73M2 — SIGNIFICANT CHANGE UP
GLUCOSE SERPL-MCNC: 82 MG/DL — SIGNIFICANT CHANGE UP (ref 70–99)
HCT VFR BLD CALC: 41.3 % — SIGNIFICANT CHANGE UP (ref 34.5–45)
HGB BLD-MCNC: 13 G/DL — SIGNIFICANT CHANGE UP (ref 11.5–15.5)
INR BLD: 1.13 RATIO — SIGNIFICANT CHANGE UP (ref 0.88–1.16)
MAGNESIUM SERPL-MCNC: 2 MG/DL — SIGNIFICANT CHANGE UP (ref 1.6–2.6)
MCHC RBC-ENTMCNC: 30.2 PG — SIGNIFICANT CHANGE UP (ref 27–34)
MCHC RBC-ENTMCNC: 31.5 GM/DL — LOW (ref 32–36)
MCV RBC AUTO: 95.8 FL — SIGNIFICANT CHANGE UP (ref 80–100)
PHOSPHATE SERPL-MCNC: 3.2 MG/DL — SIGNIFICANT CHANGE UP (ref 2.4–4.7)
PLATELET # BLD AUTO: 271 K/UL — SIGNIFICANT CHANGE UP (ref 150–400)
POTASSIUM SERPL-MCNC: 3.8 MMOL/L — SIGNIFICANT CHANGE UP (ref 3.5–5.3)
POTASSIUM SERPL-SCNC: 3.8 MMOL/L — SIGNIFICANT CHANGE UP (ref 3.5–5.3)
PROTHROM AB SERPL-ACNC: 13.1 SEC — SIGNIFICANT CHANGE UP (ref 10.5–13.4)
RBC # BLD: 4.31 M/UL — SIGNIFICANT CHANGE UP (ref 3.8–5.2)
RBC # FLD: 13.2 % — SIGNIFICANT CHANGE UP (ref 10.3–14.5)
SODIUM SERPL-SCNC: 143 MMOL/L — SIGNIFICANT CHANGE UP (ref 135–145)
WBC # BLD: 8.89 K/UL — SIGNIFICANT CHANGE UP (ref 3.8–10.5)
WBC # FLD AUTO: 8.89 K/UL — SIGNIFICANT CHANGE UP (ref 3.8–10.5)

## 2022-05-10 PROCEDURE — 88112 CYTOPATH CELL ENHANCE TECH: CPT | Mod: 26

## 2022-05-10 PROCEDURE — 49083 ABD PARACENTESIS W/IMAGING: CPT

## 2022-05-10 PROCEDURE — 88305 TISSUE EXAM BY PATHOLOGIST: CPT | Mod: 26

## 2022-05-10 PROCEDURE — 99232 SBSQ HOSP IP/OBS MODERATE 35: CPT

## 2022-05-10 RX ORDER — ENOXAPARIN SODIUM 100 MG/ML
40 INJECTION SUBCUTANEOUS EVERY 24 HOURS
Refills: 0 | Status: DISCONTINUED | OUTPATIENT
Start: 2022-05-10 | End: 2022-05-11

## 2022-05-10 RX ADMIN — SODIUM CHLORIDE 100 MILLILITER(S): 9 INJECTION INTRAMUSCULAR; INTRAVENOUS; SUBCUTANEOUS at 17:26

## 2022-05-10 RX ADMIN — FAMOTIDINE 20 MILLIGRAM(S): 10 INJECTION INTRAVENOUS at 17:26

## 2022-05-10 RX ADMIN — ENOXAPARIN SODIUM 40 MILLIGRAM(S): 100 INJECTION SUBCUTANEOUS at 21:44

## 2022-05-10 RX ADMIN — SODIUM CHLORIDE 100 MILLILITER(S): 9 INJECTION INTRAMUSCULAR; INTRAVENOUS; SUBCUTANEOUS at 00:30

## 2022-05-10 NOTE — PATIENT PROFILE ADULT - FALL HARM RISK - HARM RISK INTERVENTIONS
Assistance OOB with selected safe patient handling equipment/Communicate Risk of Fall with Harm to all staff/Discuss with provider need for PT consult/Monitor gait and stability/Provide patient with walking aids - walker, cane, crutches/Reinforce activity limits and safety measures with patient and family/Tailored Fall Risk Interventions/Use of alarms - bed, chair and/or voice tab/Visual Cue: Yellow wristband and red socks/Bed in lowest position, wheels locked, appropriate side rails in place/Call bell, personal items and telephone in reach/Instruct patient to call for assistance before getting out of bed or chair/Non-slip footwear when patient is out of bed/[place] to call system/Physically safe environment - no spills, clutter or unnecessary equipment/Purposeful Proactive Rounding/Room/bathroom lighting operational, light cord in reach

## 2022-05-11 ENCOUNTER — TRANSCRIPTION ENCOUNTER (OUTPATIENT)
Age: 77
End: 2022-05-11

## 2022-05-11 VITALS
RESPIRATION RATE: 18 BRPM | SYSTOLIC BLOOD PRESSURE: 123 MMHG | TEMPERATURE: 99 F | OXYGEN SATURATION: 97 % | DIASTOLIC BLOOD PRESSURE: 81 MMHG | HEART RATE: 89 BPM

## 2022-05-11 PROCEDURE — 86304 IMMUNOASSAY TUMOR CA 125: CPT

## 2022-05-11 PROCEDURE — 86803 HEPATITIS C AB TEST: CPT

## 2022-05-11 PROCEDURE — 85730 THROMBOPLASTIN TIME PARTIAL: CPT

## 2022-05-11 PROCEDURE — 71045 X-RAY EXAM CHEST 1 VIEW: CPT

## 2022-05-11 PROCEDURE — 86301 IMMUNOASSAY TUMOR CA 19-9: CPT

## 2022-05-11 PROCEDURE — 70450 CT HEAD/BRAIN W/O DYE: CPT

## 2022-05-11 PROCEDURE — 74177 CT ABD & PELVIS W/CONTRAST: CPT

## 2022-05-11 PROCEDURE — 84100 ASSAY OF PHOSPHORUS: CPT

## 2022-05-11 PROCEDURE — 99239 HOSP IP/OBS DSCHRG MGMT >30: CPT

## 2022-05-11 PROCEDURE — 85610 PROTHROMBIN TIME: CPT

## 2022-05-11 PROCEDURE — 84484 ASSAY OF TROPONIN QUANT: CPT

## 2022-05-11 PROCEDURE — 85027 COMPLETE CBC AUTOMATED: CPT

## 2022-05-11 PROCEDURE — 86900 BLOOD TYPING SEROLOGIC ABO: CPT

## 2022-05-11 PROCEDURE — 83690 ASSAY OF LIPASE: CPT

## 2022-05-11 PROCEDURE — 80048 BASIC METABOLIC PNL TOTAL CA: CPT

## 2022-05-11 PROCEDURE — 88305 TISSUE EXAM BY PATHOLOGIST: CPT

## 2022-05-11 PROCEDURE — 86901 BLOOD TYPING SEROLOGIC RH(D): CPT

## 2022-05-11 PROCEDURE — 96375 TX/PRO/DX INJ NEW DRUG ADDON: CPT

## 2022-05-11 PROCEDURE — 83735 ASSAY OF MAGNESIUM: CPT

## 2022-05-11 PROCEDURE — 0225U NFCT DS DNA&RNA 21 SARSCOV2: CPT

## 2022-05-11 PROCEDURE — 96374 THER/PROPH/DIAG INJ IV PUSH: CPT

## 2022-05-11 PROCEDURE — 93005 ELECTROCARDIOGRAM TRACING: CPT

## 2022-05-11 PROCEDURE — 36415 COLL VENOUS BLD VENIPUNCTURE: CPT

## 2022-05-11 PROCEDURE — 74176 CT ABD & PELVIS W/O CONTRAST: CPT | Mod: MA

## 2022-05-11 PROCEDURE — 82378 CARCINOEMBRYONIC ANTIGEN: CPT

## 2022-05-11 PROCEDURE — 99285 EMERGENCY DEPT VISIT HI MDM: CPT | Mod: 25

## 2022-05-11 PROCEDURE — 80053 COMPREHEN METABOLIC PANEL: CPT

## 2022-05-11 PROCEDURE — 85025 COMPLETE CBC W/AUTO DIFF WBC: CPT

## 2022-05-11 PROCEDURE — 82803 BLOOD GASES ANY COMBINATION: CPT

## 2022-05-11 PROCEDURE — 88112 CYTOPATH CELL ENHANCE TECH: CPT

## 2022-05-11 PROCEDURE — 86850 RBC ANTIBODY SCREEN: CPT

## 2022-05-11 PROCEDURE — 76942 ECHO GUIDE FOR BIOPSY: CPT

## 2022-05-11 RX ADMIN — FAMOTIDINE 20 MILLIGRAM(S): 10 INJECTION INTRAVENOUS at 05:05

## 2022-05-11 NOTE — DISCHARGE NOTE PROVIDER - CARE PROVIDER_API CALL
David Garrido)  Surgery  [address] [place] [address]  Phone: [phone]  Fax: [phone]  Follow Up Time: 1 week    Frankie Marroquin)  [facility]  [address] [place] [address]  Phone: [phone]  Fax: [phone]  Follow Up Time: 1 week

## 2022-05-11 NOTE — DISCHARGE NOTE PROVIDER - NSDCCPCAREPLAN_GEN_ALL_CORE_FT
PRINCIPAL DISCHARGE DIAGNOSIS  Diagnosis: Ascites  Assessment and Plan of Treatment:       SECONDARY DISCHARGE DIAGNOSES  Diagnosis: Ovarian mass  Assessment and Plan of Treatment:

## 2022-05-11 NOTE — DISCHARGE NOTE PROVIDER - CARE PROVIDERS DIRECT ADDRESSES
[email or web address]

## 2022-05-11 NOTE — PROGRESS NOTE ADULT - ASSESSMENT
A/P - 77yo  LMP age 52 admitted for workup of appendiceal vs. ovarian primary tumor. ECOG 0. suspicious for pseudomyxoma peritonei. Awaiting surg onc recommendations  Cancer type: suspicious for pseudomyxoma peritonei  Cardiac: No cardiac issues, BP well controlled.   Pulmonary: no active disease  Neuro: no pain  Endo: no active disease    GI: made NPO in case of recommendation for biopsy today.  : voiding spontaneously  ID: no abx indicated at this time  Heme: SCDs when not ambulating, Consider Lovenox for VTE prophylaxis pending possible procedure. CBC wnl will continue with AM labs.   Skin: no active disease   Psych: found to be acutely altered this AM s/p haldol 5 + ativan 2. Likely delirium. Will monitor closely.   Dispo: pending surg onc recommendations
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    NPO  IVF  Hold lovenox      
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 pending repoprt    Resume diet  DVT ppx  Pain control  F/U IR and results  
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 Appendix not visualized despite imaging with contrast, otherwise stable, plan pending multidisciplinary discussion    PLAN:    - Diet per Gyn/Onc  - Might need biopsy  - MDTD

## 2022-05-11 NOTE — DISCHARGE NOTE PROVIDER - PROVIDER TOKENS
PROVIDER:[TOKEN:[[id number]],FOLLOWUP:[1 week]],PROVIDER:[TOKEN:[[id number]],FOLLOWUP:[1 week]]

## 2022-05-11 NOTE — DISCHARGE NOTE NURSING/CASE MANAGEMENT/SOCIAL WORK - NSDCPEFALRISK_GEN_ALL_CORE
For information on Fall & Injury Prevention, visit: [email or web address] OR  [email or web address] OR  https://www.cdc.gov/steadi/patient.html

## 2022-05-11 NOTE — DISCHARGE NOTE NURSING/CASE MANAGEMENT/SOCIAL WORK - PATIENT PORTAL LINK FT
You can access the FollowMyHealth Patient Portal offered by [facility] by registering at the following website: [email or web address]. By joining [employer]’s FollowMyHealth portal, you will also be able to view your health information using other applications (apps) compatible with our system.

## 2022-05-11 NOTE — DISCHARGE NOTE PROVIDER - HOSPITAL COURSE
Ms. Thompson is a 75yo F who presented to the ED with complaints of progressive abdominal distenstion and pain. CT demonstrated extensive loculated ascites with calcified peritoneal implants, endometrial calcification, bilatral adenxal implants, with concern for pseudomysoma peritonei. Appendix not visualized. Patient was admitted initially to gyn onc service with concern for ovarian cancer vs appendiceal cancer. Transferred to surgical oncology service. Tumor markers wnl. Patient is s/p IR paracentesis with ~4L of julisa color fluid drained, and sent to cytology. Results pending. Patient otherwise clinically stable. Abdominal pain and distension improved, tolerating a diet. Ambulating without difficulty.

## 2022-05-11 NOTE — DISCHARGE NOTE PROVIDER - NSDCMRMEDTOKEN_GEN_ALL_CORE_FT
acetaminophen 325 mg oral tablet: 2 tab(s) orally every 6 hours MDD:8 take with the ibuprofen  ibuprofen 400 mg oral tablet: 1 tab(s) orally every 6 hours MDD:4 take with food  oxyCODONE 5 mg oral tablet: 1 tab(s) orally once a day (at bedtime) MDD:1  rolling walker: Rolling Walker   Dx Bilateral fibula fracture   S82.409A    Disp 1   WALKER, please dispense to patient:

## 2022-05-11 NOTE — PROGRESS NOTE ADULT - SUBJECTIVE AND OBJECTIVE BOX
INTERVAL HPI/OVERNIGHT EVENTS:    Patient evaluated at bedside. No acute distress. No acute events overnight.    MEDICATIONS  (STANDING):  famotidine    Tablet 20 milliGRAM(s) Oral two times a day  sodium chloride 0.9%. 1000 milliLiter(s) (100 mL/Hr) IV Continuous <Continuous>    MEDICATIONS  (PRN):      Vital Signs Last 24 Hrs  T(C): 36.1 (09 May 2022 15:33), Max: 36.3 (09 May 2022 04:47)  T(F): 97 (09 May 2022 15:33), Max: 97.3 (09 May 2022 04:47)  HR: 69 (09 May 2022 15:33) (68 - 96)  BP: 114/78 (09 May 2022 15:33) (114/78 - 137/92)  BP(mean): --  RR: 18 (09 May 2022 15:33) (18 - 18)  SpO2: 98% (09 May 2022 15:33) (98% - 100%)    General: NAD, Lying in bed comfortably  Neuro: A+Ox3  HEENT: EOMI, PERRLA, MMM  Cardio: RRR  Resp: Non labored breathing on RA  GI/Abd: Soft, distended non tender, no rebound/guarding, fluid wave positive, no periumbilical nodes  Vascular: All 4 extremities warm and well perfused.   Pelvis: stable  Musculoskeletal: All 4 extremities moving spontaneously, no limitations, no spinal tenderness.    I&O's Detail      LABS:                        14.7   10.86 )-----------( 199      ( 09 May 2022 12:43 )             47.1     05-09    138  |  102  |  18.4  ----------------------------<  95  4.3   |  21.0<L>  |  0.69    Ca    9.1      09 May 2022 17:39  Phos  4.0     05-09  Mg     2.3     05-09            RADIOLOGY & ADDITIONAL STUDIES:
INTERVAL HPI/OVERNIGHT EVENTS:    Patient evaluated at bedside. No acute distress. No acute events overnight. Patient underwent paracentesis yesterday without complication, pending IR report. Remains hemodynamically stable and afebrile.     MEDICATIONS  (STANDING):  enoxaparin Injectable 40 milliGRAM(s) SubCutaneous every 24 hours  famotidine    Tablet 20 milliGRAM(s) Oral two times a day  sodium chloride 0.9%. 1000 milliLiter(s) (100 mL/Hr) IV Continuous <Continuous>    MEDICATIONS  (PRN):      Vital Signs Last 24 Hrs  T(C): 36.8 (11 May 2022 04:51), Max: 36.9 (10 May 2022 19:09)  T(F): 98.3 (11 May 2022 04:51), Max: 98.5 (10 May 2022 19:09)  HR: 81 (11 May 2022 04:51) (66 - 83)  BP: 114/58 (11 May 2022 04:51) (114/58 - 148/83)  BP(mean): --  RR: 16 (11 May 2022 04:51) (16 - 18)  SpO2: 97% (11 May 2022 04:51) (95% - 98%)    General: NAD, Lying in bed comfortably  Neuro: A+Ox3  HEENT: EOMI, PERRLA, MMM  Cardio: RRR  Resp: Non labored breathing on RA  GI/Abd: Soft, distended non tender, no rebound/guarding, fluid wave positive, no periumbilical nodes  Vascular: All 4 extremities warm and well perfused.   Pelvis: stable  Musculoskeletal: All 4 extremities moving spontaneously, no limitations, no spinal tenderness.        I&O's Detail    10 May 2022 07:01  -  11 May 2022 05:32  --------------------------------------------------------  IN:    sodium chloride 0.9%: 1000 mL  Total IN: 1000 mL    OUT:  Total OUT: 0 mL    Total NET: 1000 mL          LABS:                        13.0   8.89  )-----------( 271      ( 10 May 2022 07:50 )             41.3     05-10    143  |  107  |  17.6  ----------------------------<  82  3.8   |  22.0  |  0.74    Ca    8.4<L>      10 May 2022 07:50  Phos  3.2     05-10  Mg     2.0     05-10      PT/INR - ( 10 May 2022 15:23 )   PT: 13.1 sec;   INR: 1.13 ratio         PTT - ( 10 May 2022 15:23 )  PTT:32.8 sec      RADIOLOGY & ADDITIONAL STUDIES:
Diagnosis: Ascites    Procedure: Paracentesis    : Gage Arias MD    Contrast: None    Anesthesia: 1% Lidocaine Subcutaneous    Estimated Blood Loss: Less than 10cc    Specimens: None/ Identified, Labeled, Confirmed and Sent to Lab.    Complications: No Immediate Complications    Anticoagulation: Resume in 24 Hours    Findings & Plan: RLQ Paracentesis performed w 5F Yueh needle after local anesthesia under US guidance. 4810 cc of julisa/ seroganguinous fluid drained from abdomen.       Please call Interventional Radiology with any questions, concerns, or issues. 
GYNECOLOGIC ONCOLOGY PROGRESS NOTE    HD#2    PROBLEMS:  Malignancy    SUBJECTIVE:  At 6am, patient had acute change in mental status and found to be agitated - s/p Haldol 5, ativan 2.   Pt seen and examined at bedside in CDU. Resting after medication. Unable to be interviewed. As per overnight nurse, prior to altered state, had no issues overnight.    OBJECTIVE:     VITALS:  T(F): 97.3 (05-09-22 @ 04:47), Max: 98.4 (05-08-22 @ 20:04)  HR: 77 (05-09-22 @ 08:16) (77 - 97)  BP: 135/83 (05-09-22 @ 08:16) (135/77 - 146/72)  RR: 18 (05-09-22 @ 08:16) (18 - 18)  SpO2: 100% (05-09-22 @ 08:16) (97% - 100%)  Wt(kg): --    I&O's Summary      MEDICATIONS  (STANDING):  famotidine    Tablet 20 milliGRAM(s) Oral two times a day    MEDICATIONS  (PRN):      Physical Exam:  Constitutional: resting. unable to assess AO  Pulmonary: clear to auscultation bilaterally   Cardiovascular: Regular rate and rhythm   Abdomen: soft, non-tender, distended  Extremities: no lower extremity edema or calve tenderness, Lucrecia's sign negative.    LABS:                        13.5   10.89 )-----------( 308      ( 09 May 2022 07:36 )             41.4     05-09    141  |  103  |  20.5<H>  ----------------------------<  132<H>  3.6   |  22.0  |  0.89    Ca    9.0      09 May 2022 07:36    TPro  8.3  /  Alb  4.2  /  TBili  0.7  /  DBili  x   /  AST  19  /  ALT  14  /  AlkPhos  106  05-07      RADIOLOGY & ADDITIONAL TESTS:  < from: CT Head No Cont (05.09.22 @ 06:14) >  ACC: [id number] EXAM:  CT BRAIN                          PROCEDURE DATE:  05/09/2022          INTERPRETATION:  CT HEAD WITHOUT IV CONTRAST    HISTORY: Change in mental status. 76-year-old with abdominal ascites and   new cancer diagnosis, now with AMS. Additional history per EMR: "Gyn onc   team contacted due to change in patient's mental status. Patient seen and   examined at bedside. Patient appearing agitated, yelling and claiming she   was being held against her will. She was kicking and punching security   guards. She pulled out her IV. Per nurse, patient got up and went to the   bathroom. When she returned, she was aggressive and agitated. Rapid   response called. Medicine at bedside recommends Haldol".    COMPARISON: No prior CT head is available for comparison.    TECHNIQUE: Contiguous axial CT images were obtained from the skull base   to the vertex as well as the cervical spine without the administration of   intravenous contrast. Coronal and sagittal reformatted images are   provided.    FINDINGS:    There is no acute intracranial hemorrhage or major vascular distribution   infarction (when accounting for streak artifact).    Scattered and confluent hypodensities within the   subcortical/periventricular white matter, bilateral gangliocapsular   regions and evelyn, nonspecific but likely sequela of chronic small vessel   ischemia and/or prior chronic lacunar infarcts.    There is no mass effect, edema, or midline shift. The basal cisterns are   patent.  There is no hydrocephalus.  No extra-axial collection is identified.    Partially empty sella. Normal pineal gland calcifications are noted.  No cerebellar tonsillar herniation/ectopia.    The visualized orbits, paranasal sinuses and mastoid air cells are   grossly clear.    No acute calvarial fracture is identified. Hyperostosis frontalis   incidentally noted.      IMPRESSION:    1.  No CT evidence for acute intracranial hemorrhage, major vascular   distribution infarction or mass effect. If the patient has persistent   symptoms and/or new focal neurologic deficits, consider further   evaluation with MRI as it is a more sensitive modality.    2.  Nonspecific findings, most commonly a sequela of chronic small vessel   ischemia and/or prior chronic lacunar infarcts.        Findings were discussed with Dr. Marroquin's medical team via telephone on   5/9/2022 at 8:17 AM with verbal read back.    --- End of Report ---    < end of copied text >          
HPI/OVERNIGHT EVENTS: Patient seen and examined at bedside this AM. No overnight events. TUmor markers WNL, CT with contrast still hard to visualize appendix, otherwise VSS afebrile    Vital Signs Last 24 Hrs  T(C): 36.4 (09 May 2022 00:10), Max: 36.9 (08 May 2022 03:21)  T(F): 97.6 (09 May 2022 00:10), Max: 98.5 (08 May 2022 03:21)  HR: 91 (09 May 2022 00:10) (87 - 97)  BP: 135/77 (09 May 2022 00:10) (117/75 - 148/88)  BP(mean): --  RR: 18 (09 May 2022 00:10) (18 - 18)  SpO2: 98% (09 May 2022 00:10) (94% - 98%)    I&O's Detail      General: NAD, Lying in bed comfortably  Neuro: A+Ox3  HEENT: EOMI, PERRLA, MMM  Cardio: RRR  Resp: Non labored breathing on RA  GI/Abd: Soft, distended non tender, no rebound/guarding, fluid wave positive, no periumbilical nodes  Vascular: All 4 extremities warm and well perfused.   Pelvis: stable  Musculoskeletal: All 4 extremities moving spontaneously, no limitations, no spinal tenderness.    LABS:                        16.4   19.25 )-----------( 388      ( 07 May 2022 18:55 )             49.9     05-07    139  |  99  |  17.1  ----------------------------<  153<H>  4.3   |  22.0  |  1.15    Ca    10.3<H>      07 May 2022 18:55    TPro  8.3  /  Alb  4.2  /  TBili  0.7  /  DBili  x   /  AST  19  /  ALT  14  /  AlkPhos  106  05-07          MEDICATIONS  (STANDING):  famotidine    Tablet 20 milliGRAM(s) Oral two times a day    MEDICATIONS  (PRN):      MICRO:   Cultures     STUDIES:   EKG, CXR, U/S, CT, MRI

## 2022-05-11 NOTE — DISCHARGE NOTE PROVIDER - NSDCFUADDINST_GEN_ALL_CORE_FT
Please resume home medications. Please return to ED if you experience increasing abdominal pain, inability to tolerate food by mouth, nausea and vomiting, fever/chills and/or shortness of breath.

## 2022-05-13 LAB — NON-GYNECOLOGICAL CYTOLOGY STUDY: SIGNIFICANT CHANGE UP

## 2022-05-23 ENCOUNTER — APPOINTMENT (OUTPATIENT)
Dept: GYNECOLOGIC ONCOLOGY | Facility: CLINIC | Age: 77
End: 2022-05-23
Payer: MEDICARE

## 2022-05-23 DIAGNOSIS — Z63.4 DISAPPEARANCE AND DEATH OF FAMILY MEMBER: ICD-10-CM

## 2022-05-23 DIAGNOSIS — R18.8 OTHER ASCITES: ICD-10-CM

## 2022-05-23 DIAGNOSIS — R19.00 INTRA-ABDOMINAL AND PELVIC SWELLING, MASS AND LUMP, UNSPECIFIED SITE: ICD-10-CM

## 2022-05-23 PROCEDURE — 76830 TRANSVAGINAL US NON-OB: CPT | Mod: 59

## 2022-05-23 PROCEDURE — 99205 OFFICE O/P NEW HI 60 MIN: CPT | Mod: 25

## 2022-05-23 PROCEDURE — 76857 US EXAM PELVIC LIMITED: CPT | Mod: 59

## 2022-05-23 SDOH — SOCIAL STABILITY - SOCIAL INSECURITY: DISSAPEARANCE AND DEATH OF FAMILY MEMBER: Z63.4

## 2022-05-27 NOTE — PHYSICAL EXAM
[Chaperone Present] : A chaperone was present in the examining room during all aspects of the physical examination [Rounded] : rounded [Normal] : Uterus: Normal size, no tenderness, no masses [Abnormal] : Recto-Vaginal Exam: Abnormal [FreeTextEntry1] : Patient was interviewed and examined with chaperone present. Name of chaperone: Celsa Hutchinson  [de-identified] : limited exam due to pelvic mass  [de-identified] : 12cm fixed, solid immobile mass posterior to uterus near rectum

## 2022-05-27 NOTE — HISTORY OF PRESENT ILLNESS
[FreeTextEntry1] : This 77yo , ,  LMP at 52 referred by [facility] for evaluation of ascites and abnormal CT. Pt presented to [facility] ED on 22 with acute onset of abdominal distension, nausea and abdominal pain after eating beef and broccoli. 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 IR paracentesis performed on 5/10/22 revealing negative for malignancy. The pain subsided and she feels better now. Denies n/v or bowel issues. Admits to stress urine incontinence Denies VB. She has a good appetite. Pt has not had routine gynecological care. She is not sexually active. Denies family history of gynecological malignancies. \par \par Tumor markers 22- =22, CEA=1.2, CA 19-9=9\par \par Primary GYN: Dr. Louis \par \par Health maintenance:\par Pap smear->5 years ago, \par Mammo->5 years ago \par Colonoscopy-never  \par DEXA-few years ago \par

## 2022-05-27 NOTE — CHIEF COMPLAINT
[FreeTextEntry1] : [facility]\par \par [facility] [phone] at [address] [place] [id number]\par

## 2022-05-27 NOTE — ASSESSMENT
[FreeTextEntry1] : 75yo female with ascites and large pelvic mass. Paracentesis negative for malignancy. Discussed with patient my recommendation for surgery to rule out a malignancy.  On exam, there is a 12+cm fixed, solid mass posterior to uterus near rectum. Discussed at length with the patient that she may require bowel surgery/resection if the mass is found to be adherent to the bowel. The possibility of a colostomy was also reviewed. I spoke with surgical oncologist, Dr. Garrido, who will also be present at surgery. Preop pelvic MRI ordered. \par \par I discussed at length with the patient the nature, purpose, risks, benefits, and alternatives of total abdominal hysterectomy and bilateral salpingo-oophorectomy via a vertical midline incision, possible bilateral pelvic and para-aortic lymphadenectomy and surgical staging possible bowel surgery. The patient understands the risks to include (but not be limited to) bowel injury, bleeding (with the possible need for transfusion), bladder or ureteral injury, infections, protracted wound closure, deep venous thrombosis, and campbell-operative death.  She is also aware of  the possibility of  a unrecognized surgical complication with need for subsequent re-exploration.  She also understands the rationale for surgical procedures such as omentectomy, or pelvic and para-aortic lymphadenectomy for the proper staging of a gynecological cancer.  She agrees to proceed.  She asked numerous questions which were answered to her satisfaction.  She understands the need for a pre-operative bowel preparation and agrees to comply with our instructions.

## 2022-05-27 NOTE — END OF VISIT
[FreeTextEntry3] : Written by Celsa BRIZUELA, acting as a scribe for Dr. Frankie Marroquin.\par This note accurately reflects the work and decisions made by me.\par

## 2022-06-01 ENCOUNTER — APPOINTMENT (OUTPATIENT)
Dept: SURGICAL ONCOLOGY | Facility: CLINIC | Age: 77
End: 2022-06-01

## 2022-06-03 ENCOUNTER — APPOINTMENT (OUTPATIENT)
Dept: MRI IMAGING | Facility: CLINIC | Age: 77
End: 2022-06-03
Payer: MEDICARE

## 2022-06-03 ENCOUNTER — OUTPATIENT (OUTPATIENT)
Dept: OUTPATIENT SERVICES | Facility: HOSPITAL | Age: 77
LOS: 1 days | End: 2022-06-03

## 2022-06-03 PROCEDURE — 72197 MRI PELVIS W/O & W/DYE: CPT | Mod: 26,ME

## 2022-06-03 PROCEDURE — G1004: CPT

## 2022-06-09 ENCOUNTER — APPOINTMENT (OUTPATIENT)
Dept: SURGICAL ONCOLOGY | Facility: CLINIC | Age: 77
End: 2022-06-09

## 2022-06-29 ENCOUNTER — NON-APPOINTMENT (OUTPATIENT)
Age: 77
End: 2022-06-29

## 2023-12-02 NOTE — ED ADULT NURSE NOTE - SKIN TEMPERATURE MOISTURE
COVID Positive/Requesting COVID treatment    Patient is positive for COVID and requesting treatment options. Patient was triaged to rule out need for higher level of care. Patient is seen by Dr. Ashley at [facility].     Date of positive COVID test (PCR or at home)? 12/2/2023  Current COVID symptoms: muscle or body aches, sore throat, and congestion or runny nose  Date COVID symptoms began: 11/27/2023    Message should be routed to clinic RN pool. Best phone number to use for call back: [phone]     Suki Colin RN  12/02/23 3:23 PM  [facility] Nurse Advisor    Reason for Disposition   [1] HIGH RISK patient (e.g., weak immune system, age > 64 years, obesity with BMI 30 or higher, pregnant, chronic lung disease or other chronic medical condition) AND [2] COVID symptoms (e.g., cough, fever)  (Exceptions: Already seen by PCP and no new or worsening symptoms.)    Additional Information   Negative: SEVERE difficulty breathing (e.g., struggling for each breath, speaks in single words)   Negative: Difficult to awaken or acting confused (e.g., disoriented, slurred speech)   Negative: Bluish (or gray) lips or face now   Negative: Shock suspected (e.g., cold/pale/clammy skin, too weak to stand, low BP, rapid pulse)   Negative: Sounds like a life-threatening emergency to the triager   Negative: [1] Diagnosed or suspected COVID-19 AND [2] symptoms lasting 3 or more weeks   Negative: [1] COVID-19 exposure AND [2] no symptoms   Negative: COVID-19 vaccine reaction suspected (e.g., fever, headache, muscle aches) occurring 1 to 3 days after getting vaccine   Negative: COVID-19 vaccine, questions about   Negative: [1] Lives with someone known to have influenza (flu test positive) AND [2] flu-like symptoms (e.g., cough, runny nose, sore throat, SOB; with or without fever)   Negative: [1] Possible COVID-19 symptoms AND [2] triager concerned about severity of symptoms or other causes   Negative: COVID-19 and  breastfeeding, questions about   Negative: SEVERE or constant chest pain or pressure  (Exception: Mild central chest pain, present only when coughing.)   Negative: MODERATE difficulty breathing (e.g., speaks in phrases, SOB even at rest, pulse 100-120)   Negative: [1] Headache AND [2] stiff neck (can't touch chin to chest)   Negative: Oxygen level (e.g., pulse oximetry) 90 percent or lower   Negative: Chest pain or pressure  (Exception: MILD central chest pain, present only when coughing.)   Negative: [1] Drinking very little AND [2] dehydration suspected (e.g., no urine > 12 hours, very dry mouth, very lightheaded)   Negative: Patient sounds very sick or weak to the triager   Negative: MILD difficulty breathing (e.g., minimal/no SOB at rest, SOB with walking, pulse <100)   Negative: Fever > 103 F (39.4 C)   Negative: [1] Fever > 101 F (38.3 C) AND [2] age > 60 years   Negative: [1] Fever > 100.0 F (37.8 C) AND [2] bedridden (e.g., CVA, chronic illness, recovering from surgery)   Negative: Oxygen level (e.g., pulse oximetry) 91 to 94 percent    Protocols used: Coronavirus (COVID-19) Diagnosed or [id number]-F-BC     warm
